# Patient Record
Sex: FEMALE | Race: WHITE | Employment: PART TIME | URBAN - NONMETROPOLITAN AREA
[De-identification: names, ages, dates, MRNs, and addresses within clinical notes are randomized per-mention and may not be internally consistent; named-entity substitution may affect disease eponyms.]

---

## 2018-05-03 ENCOUNTER — HOSPITAL ENCOUNTER (EMERGENCY)
Age: 28
Discharge: HOME | End: 2018-05-03
Payer: COMMERCIAL

## 2018-05-03 VITALS
HEART RATE: 72 BPM | RESPIRATION RATE: 16 BRPM | BODY MASS INDEX: 35.43 KG/M2 | TEMPERATURE: 98.1 F | SYSTOLIC BLOOD PRESSURE: 119 MMHG | WEIGHT: 200 LBS | DIASTOLIC BLOOD PRESSURE: 81 MMHG | OXYGEN SATURATION: 96 %

## 2018-05-03 DIAGNOSIS — J01.00 ACUTE NON-RECURRENT MAXILLARY SINUSITIS: Primary | ICD-10-CM

## 2018-05-03 PROCEDURE — 99213 OFFICE O/P EST LOW 20 MIN: CPT | Performed by: NURSE PRACTITIONER

## 2018-05-03 PROCEDURE — 99213 OFFICE O/P EST LOW 20 MIN: CPT

## 2018-05-03 RX ORDER — AMOXICILLIN AND CLAVULANATE POTASSIUM 875; 125 MG/1; MG/1
1 TABLET, FILM COATED ORAL 2 TIMES DAILY
Qty: 20 TABLET | Refills: 0 | Status: SHIPPED | OUTPATIENT
Start: 2018-05-03 | End: 2018-05-13

## 2018-05-03 ASSESSMENT — ENCOUNTER SYMPTOMS
NAUSEA: 0
SINUS PAIN: 1
COUGH: 1
RHINORRHEA: 1
ABDOMINAL PAIN: 0
DIARRHEA: 0
CONSTIPATION: 0
SHORTNESS OF BREATH: 0
WHEEZING: 0
SINUS PRESSURE: 1
SORE THROAT: 1

## 2018-05-03 ASSESSMENT — PAIN DESCRIPTION - LOCATION: LOCATION: THROAT

## 2018-05-03 ASSESSMENT — PAIN DESCRIPTION - PAIN TYPE: TYPE: ACUTE PAIN

## 2018-05-03 ASSESSMENT — PAIN SCALES - GENERAL: PAINLEVEL_OUTOF10: 4

## 2021-04-21 ENCOUNTER — TELEPHONE (OUTPATIENT)
Dept: ENT CLINIC | Age: 31
End: 2021-04-21

## 2021-04-22 NOTE — TELEPHONE ENCOUNTER
Lets try May 3rd or May 5th at 9am. I can have Dr Laureen Sierra check the area as well if she is seen on one of those days. We can have her follow up with a physician if necessary. She can be scheduled with a physician if she wants, but she will have to wait longer for an appointment.

## 2021-05-03 ENCOUNTER — OFFICE VISIT (OUTPATIENT)
Dept: ENT CLINIC | Age: 31
End: 2021-05-03
Payer: COMMERCIAL

## 2021-05-03 VITALS
WEIGHT: 206 LBS | SYSTOLIC BLOOD PRESSURE: 128 MMHG | TEMPERATURE: 98.1 F | HEIGHT: 63 IN | RESPIRATION RATE: 14 BRPM | BODY MASS INDEX: 36.5 KG/M2 | HEART RATE: 80 BPM | DIASTOLIC BLOOD PRESSURE: 70 MMHG

## 2021-05-03 DIAGNOSIS — K13.70 ORAL LESION: Primary | ICD-10-CM

## 2021-05-03 DIAGNOSIS — Z98.890 HISTORY OF NASAL SEPTOPLASTY: ICD-10-CM

## 2021-05-03 DIAGNOSIS — Z90.89 HISTORY OF TONSILLECTOMY AND ADENOIDECTOMY: ICD-10-CM

## 2021-05-03 PROCEDURE — G8427 DOCREV CUR MEDS BY ELIG CLIN: HCPCS | Performed by: PHYSICIAN ASSISTANT

## 2021-05-03 PROCEDURE — 1036F TOBACCO NON-USER: CPT | Performed by: PHYSICIAN ASSISTANT

## 2021-05-03 PROCEDURE — G8417 CALC BMI ABV UP PARAM F/U: HCPCS | Performed by: PHYSICIAN ASSISTANT

## 2021-05-03 PROCEDURE — 99203 OFFICE O/P NEW LOW 30 MIN: CPT | Performed by: PHYSICIAN ASSISTANT

## 2021-05-03 NOTE — PROGRESS NOTES
[facility] [place] [facility]  [facility] EAR, NOSE AND THROAT  [facility] [place]  Dept: [phone]  Dept Fax: [phone]  Loc: [phone]    Ludmila Martinez is a 31 y.o. female who was referred by Abraham Strange for:  Chief Complaint   Patient presents with    Lesion(s)     New patient is here for oral lesion ref by Dr. Mariella Bower. patient stated that she only has 1 skin tag like lesion    . HPI:     Patient presents for evaluation of right-sided oral lesions. Patient reports that these appear like skin tags to her. 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 She states that couple weeks ago she noticed something on the right side which that are mildly concerned. She states that it is small and has remained stable in size since she initially noticed it. She denies any associated symptoms of pain/odynophagia, dysphagia, night sweats, fevers, chills, changes in voice, unintentional weight loss, loss of appetite. She denies any history of HPV that she is aware of. She states that she had her tonsils removed when she was 14years old. She had an adenoidectomy and septoplasty with Dr. Honey Ng in 2015. She does report a history of smoking cigarettes for about 10 years as well as smoking marijuana for about 16 years. She denies any recent alcohol consumption but states she used to drink it socially in the past.  She was a previous heroin user and is on Vivitrol. She denies any other symptoms or concerns at this time. Subjective:      REVIEW OF SYSTEMS:    A complete multi-organ review of systems was performed using a new patient questionnaire, and reviewed by me.   ENT:  negative except as noted in HPI  CONSTITUTIONAL:  negative except as noted in HPI  EYES:  negative except as noted in HPI  RESPIRATORY:  negative except as noted in HPI  CARDIOVASCULAR:  negative except as noted in HPI  GASTROINTESTINAL:  negative except as noted in HPI  GENITOURINARY:  negative except as noted in HPI  MUSCULOSKELETAL:  negative except as noted in HPI  SKIN:  negative except as noted in HPI  ENDOCRINE/METABOLIC: negative except as noted in HPI  HEMATOLOGIC/LYMPHATIC:  negative except as noted in HPI  ALLERGY/IMMUN: negative except as noted in HPI  NEUROLOGICAL:  negative except as noted in HPI  BEHAVIOR/PSYCH:  negative except as noted in HPI    Past Medical History:  Past Medical History:   Diagnosis Date    Bipolar disorder ([facility] [address]     Pt states dx in     Tobacco abuse      Social History:    TOBACCO:   reports that she quit smoking about 6 years ago. Her smoking use included cigarettes. She started smoking about 14 years ago. She has a 3.00 pack-year smoking history. She has never used smokeless tobacco.  ETOH:   reports no history of alcohol use. DRUGS:   reports previous drug use. Family History:       Problem Relation Age of Onset    Bipolar Disorder Mother     Schizophrenia Mother     COPD Mother     Emphysema Mother     Other Father         Nodules in lungs due to painting for years.  COPD Maternal Grandmother     Emphysema Maternal Grandmother     Other Maternal Grandfather         Pt states maternal grandpa  during heart procedure.  Rheum Arthritis Paternal Grandmother        Surgical History:  Past Surgical History:   Procedure Laterality Date    ADENOIDECTOMY  1/15/2016    BLADDER REPAIR      Pt states bladder construction when she was 1 y.o.    ELBOW SURGERY  2010    left   arthroscopy    SEPTOPLASTY  1/15/2016    Turbinoplasties    TONSILLECTOMY      at 14years of age   Mirian Navarrete [address]      Pt states had this at 14 y.o.    WISDOM TOOTH EXTRACTION          Objective: This is a 31 y.o. female. Patient is alert and oriented to person, place and time.  Patient appears well developed, well nourished. Mood is happy with normal affect. Not obviously hearing impaired. No abnormality in speech noted. /70 (Site: Left Upper Arm, Position: Sitting)   Pulse 80   Temp 98.1 °F (36.7 °C) (Infrared)   Resp 14   Ht 5' 3\" (1.6 m)   Wt 206 lb (93.4 kg)   BMI 36.49 kg/m²     Head:   Normocephalic, atraumatic. No obvious masses or lesions noted. Ears:  External ears: Normal: no scars, lesions or masses. Mastoid process: No erythema noted. No tenderness to palpation. R External auditory canal: clear and free of any pathology  L External auditory canal: clear and free of any pathology   Tympanic membranes:  R intact, translucent                                                  L intact, translucent  Nose:    External nose: Appears midline. No obvious deformity or masses. Septum:  normal. No septal hematoma. No perforation. Mucosa:  clear  Turbinates: normal and pink            Discharge:  none    Mouth/Throat:  Lips, tongue and oral cavity: 2 small lesions along the posterior right tongue (only a few mm's wide). Very close to the lateral surface. There are no other visible oral lesions at this time. These appear to be most likely papillae. See picture below. Dentition: Fair, no malocclusion  Oral mucosa: moist  Tonsils: Absent  Oropharynx: normal-appearing mucosa  Hard and soft palates: symmetrical and intact. Salivary glands: not enlarged and no tenderness to palpation. Uvula: midline, no obvious lesions   Gag reflex is present. Neck: Trachea midline. Thyroid not enlarged, no palpable masses or tenderness. Lymphatic: No cervical lymphadenopathy noted. Eyes: CEDRICK, EOM intact. Conjunctiva moist without discharge. Lungs: Normal effort of breathing, not obviously distressed. Neuro: Cranial nerves II-XII grossly intact. Extremities: No clubbing, edema, or cyanosis noted. Assessment/Plan:     Diagnosis Orders   1. Oral lesion     2.  History of tonsillectomy and adenoidectomy 3. History of nasal septoplasty         The patient is a 31 y.o. female that presents for evaluation of oral lesions. Explained to the patient that these lesions are not concerning appearing at this time and she has no concerning associated symptoms as well. I recommended she follow-up in a few weeks for repeat examination. If these persist will consider biopsy to rule out any concerning etiologies. Patient will contact the office sooner with new/worsening symptoms or other concerns. She expresses understanding the plan and thanked me. I discussed the plan with Dr. Bryce Sánchez who also reviewed the picture after the visit was completed and he agreed with the current recommendation for follow-up examination and possible biopsy in future.     Electronically signed by KE Hughes on 5/4/2021 at 2:12 PM

## 2021-05-04 ENCOUNTER — TELEPHONE (OUTPATIENT)
Dept: ENT CLINIC | Age: 31
End: 2021-05-04

## 2021-05-04 NOTE — TELEPHONE ENCOUNTER
Called patient. Informed her Dr Mikal Hoffmann does not feel her oral lesions are concerning at this time. He does however want to see her back in 2-3 weeks to re-examine the lesions to be sure nothing has changed. Informed patient she should call us sooner if she notices any changes in the size or any new concerning symptoms arise. Appointment scheduled. Patient verbalized understanding and thanked me.

## 2021-05-04 NOTE — TELEPHONE ENCOUNTER
Please contact patient and inform her that Dr. Yandy Yee does not feel that the oral lesions are concerning at this time. However, we would like the patient to follow-up in approximately 2 to 3 weeks for repeat examination to ensure nothing has changed. We will discuss if a biopsy is recommended at that visit if the lesions remain. She should call us sooner if she notices any changes in the size or develops any other concerning symptoms.

## 2021-05-18 ENCOUNTER — OFFICE VISIT (OUTPATIENT)
Dept: ENT CLINIC | Age: 31
End: 2021-05-18
Payer: COMMERCIAL

## 2021-05-18 VITALS
WEIGHT: 208.9 LBS | RESPIRATION RATE: 14 BRPM | SYSTOLIC BLOOD PRESSURE: 126 MMHG | HEIGHT: 63 IN | TEMPERATURE: 98.1 F | DIASTOLIC BLOOD PRESSURE: 74 MMHG | HEART RATE: 88 BPM | BODY MASS INDEX: 37.02 KG/M2

## 2021-05-18 DIAGNOSIS — R59.9 LYMPHOID HYPERPLASIA: Primary | ICD-10-CM

## 2021-05-18 PROBLEM — H44.9 DISORDER OF EYEBALL: Status: ACTIVE | Noted: 2021-05-18

## 2021-05-18 PROBLEM — L70.9 ACNE: Status: ACTIVE | Noted: 2017-10-24

## 2021-05-18 PROBLEM — R05.9 COUGH: Status: ACTIVE | Noted: 2018-03-20

## 2021-05-18 PROBLEM — E66.9 SIMPLE OBESITY: Status: ACTIVE | Noted: 2019-11-11

## 2021-05-18 PROBLEM — J40 BRONCHITIS: Status: ACTIVE | Noted: 2020-01-23

## 2021-05-18 PROBLEM — R00.0 TACHYCARDIA, UNSPECIFIED: Status: ACTIVE | Noted: 2017-10-24

## 2021-05-18 PROBLEM — F17.210 TOBACCO DEPENDENCE DUE TO CIGARETTES: Status: ACTIVE | Noted: 2020-07-31

## 2021-05-18 PROBLEM — M79.673 PAIN OF FOOT: Status: ACTIVE | Noted: 2021-05-18

## 2021-05-18 PROBLEM — J06.9 UPPER RESPIRATORY TRACT INFECTION: Status: ACTIVE | Noted: 2017-11-27

## 2021-05-18 PROBLEM — A60.00 GENITAL HERPES: Status: ACTIVE | Noted: 2017-08-16

## 2021-05-18 PROBLEM — E66.9 OBESITY WITH BODY MASS INDEX 30 OR GREATER: Status: ACTIVE | Noted: 2017-08-16

## 2021-05-18 PROBLEM — T50.901A DRUG OVERDOSE: Status: ACTIVE | Noted: 2021-05-18

## 2021-05-18 PROCEDURE — G8427 DOCREV CUR MEDS BY ELIG CLIN: HCPCS | Performed by: OTOLARYNGOLOGY

## 2021-05-18 PROCEDURE — 99213 OFFICE O/P EST LOW 20 MIN: CPT | Performed by: OTOLARYNGOLOGY

## 2021-05-18 PROCEDURE — G8417 CALC BMI ABV UP PARAM F/U: HCPCS | Performed by: OTOLARYNGOLOGY

## 2021-05-18 PROCEDURE — 1036F TOBACCO NON-USER: CPT | Performed by: OTOLARYNGOLOGY

## 2021-05-18 NOTE — PROGRESS NOTES
[facility] [place] [facility]  [facility] EAR, NOSE AND THROAT  [facility]  Dept: [phone]  Dept Fax: [phone]  Loc: [phone]    Emma Sky is a 31 y.o. female who was referred by No ref. provider found for:  Chief Complaint   Patient presents with    Mouth Lesions     Patient is her for 2 week follow up oral lesion        HPI:     Emma Sky is a 31 y.o. female seen a couple of weeks ago by Archie Covington PA-C for evaluation of an oral cavity lesion along the anterior pillar of the right tonsillar fossa. There is a picture in the chart media section as well as an Stievn's note with an image at that lesion. The patient is here for further evaluation and consideration of a biopsy. Of note is the fact that she has no particular risk factors for head neck cancer and states that not only is the lesion nontender but it has never bled nor has she noticed any particular change in that area. History:     No Known Allergies  Current Outpatient Medications   Medication Sig Dispense Refill    naltrexone (VIVITROL) 380 MG injection Inject 380 mg into the muscle once      etonogestrel (NEXPLANON) 68 MG implant Inject 68 mg into the skin once       No current facility-administered medications for this visit.      Past Medical History:   Diagnosis Date    Bipolar disorder ([facility] [address]     Pt states dx in 2009    Depressive disorder 5/2/2011    Simple obesity 11/11/2019    Tobacco abuse       Past Surgical History:   Procedure Laterality Date    ADENOIDECTOMY  1/15/2016    BLADDER REPAIR      Pt states bladder construction when she was 1 y.o.    ELBOW SURGERY  2010    left   arthroscopy    SEPTOPLASTY  1/15/2016    Turbinoplasties    TONSILLECTOMY      at 14years of age   Regina Kennedy TONSILLECTOMY AND ADENOIDECTOMY      Pt states had this at 14 y.o.    WISDOM TOOTH EXTRACTION  2015     Family History   Problem Relation Age of Onset     10/28/2016     10/06/2016    K 3.8 11/13/2016    K 4.2 10/28/2016    K 4.1 10/06/2016     11/13/2016     10/28/2016     10/06/2016    CO2 26 11/13/2016    CO2 26 10/28/2016    CO2 22 10/06/2016    BUN 10 11/13/2016    BUN 16 10/28/2016    BUN 16 10/06/2016    CREATININE 0.6 11/13/2016    CREATININE 0.91 10/28/2016    CREATININE 1.0 10/06/2016    CALCIUM 9.2 11/13/2016    CALCIUM 8.5 10/28/2016    CALCIUM 9.3 10/06/2016    PROT 7.1 11/13/2016    PROT 6.1 10/28/2016    PROT 7.3 10/06/2016    LABALBU 4.3 11/13/2016    LABALBU 4.6 10/28/2016    LABALBU 4.5 10/06/2016    BILITOT 0.5 11/13/2016    BILITOT 0.4 10/28/2016    BILITOT 0.3 10/06/2016    ALKPHOS 68 11/13/2016    ALKPHOS 61 10/28/2016    ALKPHOS 71 10/06/2016    AST 15 11/13/2016    AST 24 10/28/2016    AST 24 10/06/2016    ALT 10 11/13/2016    ALT 33 10/28/2016    ALT 22 10/06/2016       All of the past medical history, past surgical history, family history,social history, allergies and current medications were reviewed with the patient. Assessment & Plan   Diagnoses and all orders for this visit:     Diagnosis Orders   1. Lymphoid hyperplasia      Of the oropharynx; bilateral; symmetrical       Based on history and these physical findings, the chances that she would have a neoplastic let alone a malignant process in identical location  by bridge of normal tissue i.e. the tongue is so remote as to be impossible. I recommended against a biopsy of either side if and until any of this tissue declares itself more concerning such as with bleeding or clear growth or extension into the surrounding spaces. I recommended against over surveillance and would be happy to see her in follow-up should she notice any particular difference. Fact that told her that she could upload a picture of the area if she thinks it has meaningfully changed not be happy to look at it and compared to these images.   It would not be difficult to

## 2021-06-17 PROBLEM — R05.9 COUGH: Status: RESOLVED | Noted: 2018-03-20 | Resolved: 2021-06-17

## 2022-06-13 SDOH — HEALTH STABILITY: PHYSICAL HEALTH: ON AVERAGE, HOW MANY MINUTES DO YOU ENGAGE IN EXERCISE AT THIS LEVEL?: 60 MIN

## 2022-06-13 SDOH — HEALTH STABILITY: PHYSICAL HEALTH: ON AVERAGE, HOW MANY DAYS PER WEEK DO YOU ENGAGE IN MODERATE TO STRENUOUS EXERCISE (LIKE A BRISK WALK)?: 2 DAYS

## 2022-06-13 ASSESSMENT — SOCIAL DETERMINANTS OF HEALTH (SDOH)

## 2022-06-15 ENCOUNTER — OFFICE VISIT (OUTPATIENT)
Dept: FAMILY MEDICINE CLINIC | Age: 32
End: 2022-06-15
Payer: COMMERCIAL

## 2022-06-15 VITALS
RESPIRATION RATE: 14 BRPM | WEIGHT: 207.4 LBS | HEART RATE: 89 BPM | TEMPERATURE: 97.4 F | SYSTOLIC BLOOD PRESSURE: 138 MMHG | HEIGHT: 63 IN | DIASTOLIC BLOOD PRESSURE: 88 MMHG | BODY MASS INDEX: 36.75 KG/M2 | OXYGEN SATURATION: 89 %

## 2022-06-15 DIAGNOSIS — B36.0 TINEA VERSICOLOR: ICD-10-CM

## 2022-06-15 DIAGNOSIS — E66.09 CLASS 2 OBESITY DUE TO EXCESS CALORIES WITHOUT SERIOUS COMORBIDITY WITH BODY MASS INDEX (BMI) OF 36.0 TO 36.9 IN ADULT: ICD-10-CM

## 2022-06-15 DIAGNOSIS — Z76.89 ENCOUNTER TO ESTABLISH CARE: Primary | ICD-10-CM

## 2022-06-15 DIAGNOSIS — F32.5 MAJOR DEPRESSIVE DISORDER IN FULL REMISSION, UNSPECIFIED WHETHER RECURRENT (HCC): ICD-10-CM

## 2022-06-15 DIAGNOSIS — F19.10 POLYSUBSTANCE ABUSE (HCC): ICD-10-CM

## 2022-06-15 PROBLEM — E66.812 CLASS 2 OBESITY DUE TO EXCESS CALORIES WITHOUT SERIOUS COMORBIDITY WITH BODY MASS INDEX (BMI) OF 36.0 TO 36.9 IN ADULT: Status: ACTIVE | Noted: 2019-11-11

## 2022-06-15 PROCEDURE — G8427 DOCREV CUR MEDS BY ELIG CLIN: HCPCS | Performed by: NURSE PRACTITIONER

## 2022-06-15 PROCEDURE — 1036F TOBACCO NON-USER: CPT | Performed by: NURSE PRACTITIONER

## 2022-06-15 PROCEDURE — 99203 OFFICE O/P NEW LOW 30 MIN: CPT | Performed by: NURSE PRACTITIONER

## 2022-06-15 PROCEDURE — G8417 CALC BMI ABV UP PARAM F/U: HCPCS | Performed by: NURSE PRACTITIONER

## 2022-06-15 RX ORDER — SUMATRIPTAN 100 MG/1
TABLET, FILM COATED ORAL
COMMUNITY
Start: 2022-04-19 | End: 2022-06-15 | Stop reason: ALTCHOICE

## 2022-06-15 RX ORDER — KETOCONAZOLE 20 MG/ML
SHAMPOO TOPICAL
Qty: 120 ML | Refills: 1 | Status: SHIPPED | OUTPATIENT
Start: 2022-06-15

## 2022-06-15 RX ORDER — BUPROPION HYDROCHLORIDE 75 MG/1
TABLET ORAL
COMMUNITY
Start: 2022-05-16 | End: 2022-06-15 | Stop reason: ALTCHOICE

## 2022-06-15 RX ORDER — TERBINAFINE HYDROCHLORIDE 250 MG/1
TABLET ORAL
COMMUNITY
Start: 2022-05-31 | End: 2022-06-15 | Stop reason: ALTCHOICE

## 2022-06-15 SDOH — ECONOMIC STABILITY: FOOD INSECURITY: WITHIN THE PAST 12 MONTHS, THE FOOD YOU BOUGHT JUST DIDN'T LAST AND YOU DIDN'T HAVE MONEY TO GET MORE.: NEVER TRUE

## 2022-06-15 SDOH — ECONOMIC STABILITY: FOOD INSECURITY: WITHIN THE PAST 12 MONTHS, YOU WORRIED THAT YOUR FOOD WOULD RUN OUT BEFORE YOU GOT MONEY TO BUY MORE.: NEVER TRUE

## 2022-06-15 ASSESSMENT — PATIENT HEALTH QUESTIONNAIRE - PHQ9
6. FEELING BAD ABOUT YOURSELF - OR THAT YOU ARE A FAILURE OR HAVE LET YOURSELF OR YOUR FAMILY DOWN: 0
SUM OF ALL RESPONSES TO PHQ QUESTIONS 1-9: 0
7. TROUBLE CONCENTRATING ON THINGS, SUCH AS READING THE NEWSPAPER OR WATCHING TELEVISION: 0
3. TROUBLE FALLING OR STAYING ASLEEP: 0
8. MOVING OR SPEAKING SO SLOWLY THAT OTHER PEOPLE COULD HAVE NOTICED. OR THE OPPOSITE, BEING SO FIGETY OR RESTLESS THAT YOU HAVE BEEN MOVING AROUND A LOT MORE THAN USUAL: 0
4. FEELING TIRED OR HAVING LITTLE ENERGY: 0
SUM OF ALL RESPONSES TO PHQ QUESTIONS 1-9: 0
SUM OF ALL RESPONSES TO PHQ QUESTIONS 1-9: 0
SUM OF ALL RESPONSES TO PHQ9 QUESTIONS 1 & 2: 0
5. POOR APPETITE OR OVEREATING: 0
9. THOUGHTS THAT YOU WOULD BE BETTER OFF DEAD, OR OF HURTING YOURSELF: 0
2. FEELING DOWN, DEPRESSED OR HOPELESS: 0
10. IF YOU CHECKED OFF ANY PROBLEMS, HOW DIFFICULT HAVE THESE PROBLEMS MADE IT FOR YOU TO DO YOUR WORK, TAKE CARE OF THINGS AT HOME, OR GET ALONG WITH OTHER PEOPLE: 0
SUM OF ALL RESPONSES TO PHQ QUESTIONS 1-9: 0
1. LITTLE INTEREST OR PLEASURE IN DOING THINGS: 0

## 2022-06-15 ASSESSMENT — SOCIAL DETERMINANTS OF HEALTH (SDOH): HOW HARD IS IT FOR YOU TO PAY FOR THE VERY BASICS LIKE FOOD, HOUSING, MEDICAL CARE, AND HEATING?: NOT VERY HARD

## 2022-06-15 NOTE — PROGRESS NOTES
[facility] at / [address] [facility] [address], [address]  Chief Complaint   Patient presents with   [address] [facility] Patient    Rash     for about 3 weeks        History obtained from chart review and the patient. SUBJECTIVE:  Alfred Ohara is a 27 y.o. female that presents today for establishing care with new physician, etc. New patient, [facility] [address]. Hx of polysubstance substance abuse (opioids, shrooms, cocaine, marijuana etc), she followed with Dr Eleanor Neves and she was receiving Vivitrol for about 5 years. She has done IV drugs 2-3 times. She has been off the Vivitrol since April and is doing well. She has completed all the programs etc and has been discharged and clean. 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 She was getting migraines whenever she was due for the Vivitrol, but since being off the Vivitrol she hasn't been getting them. She does get HA's but they are manageable with tylenol. She was treated with Wellbutrin for depression by prior PCP. She didn't like the way it made her feel, made her heart race. She has stopped it. Moods have been doing well. Rash    HPI:    Length of time Sx have been present - 2-3 weeks  Rash has gotten unchanged since initially starting  Affected areas - upper chest, abdomen  Inciting events or exposures prior to rash starting? No, but started after she recently started going to the gym and exercising  Pruritic? No  Erythematous? Yes  Weeping or drainage? No  History of Urticaria? No  Fever? No  Painful? No    Review of Systems - General ROS: negative for - chills, fever or night sweats  Respiratory ROS: negative for - shortness of breath, stridor or wheezing    She started seeing a hormone doctor in [facility] to help with weight loss. She is possibly going to start Ozempic. She is also exercising regularly.  She is not interested in Adipex due to prior Hx of substance abuse    Age/Gender Health Maintenance    Lipid - 40  DM Screen - 40  Colon Cancer Screening - 39  Lung Cancer Screening (Age 54 to [de-identified] with 30 pack year hx, current smoker or quit within past 15 years) - 48    Tetanus - needs  Influenza Vaccine - yearly  Pneumonia Vaccine - 65  Zostavax - 50   HPV Vaccine - n/a    Breast Cancer Screening - 50  Cervical Cancer Screening - 21  Osteoporosis Screening - 65  Chlamydia Screen - n/a    PSA testing discussion - n/a  AAA Screening - n/a    Falls screening - n/a    Current Outpatient Medications   Medication Sig Dispense Refill    ketoconazole (NIZORAL) 2 % shampoo Apply topically daily as needed. 120 mL 1    etonogestrel (NEXPLANON) 68 MG implant Inject 68 mg into the skin once       No current facility-administered medications for this visit. Orders Placed This Encounter   Medications    ketoconazole (NIZORAL) 2 % shampoo     Sig: Apply topically daily as needed. Dispense:  120 mL     Refill:  1         All medications reviewed and reconciled, including OTC and herbal medications. Updated list given to patient.        Patient Active Problem List   Diagnosis    Skin lesion of back    S/P tonsillectomy and adenoidectomy    Adenoid hypertrophy    Lymphoid hyperplasia    Acne    Bronchitis    Depressive disorder    Disorder of eyeball    Drug overdose    Genital herpes    Obesity with body mass index 30 or greater    Pain of foot    Class 2 obesity due to excess calories without serious comorbidity with body mass index (BMI) of 36.0 to 36.9 in adult    Tachycardia, unspecified    Tobacco dependence due to cigarettes    Upper respiratory tract infection    History of polysubstance abuse ([facility] [address]       Past Medical History:   Diagnosis Date    Bipolar disorder ([facility] [address]     Pt states dx in 2009    Depressive disorder 5/2/2011    Simple obesity 11/11/2019    Tobacco abuse        Past Surgical History:   Procedure Laterality Date    ADENOIDECTOMY  1/15/2016    BLADDER REPAIR Pt states bladder construction when she was 1 y.o.    ELBOW SURGERY  2010    left   arthroscopy    SEPTOPLASTY  1/15/2016    Turbinoplasties    TONSILLECTOMY      at 14years of age   [address]      Pt states had this at 14 y.o.    [address] EXTRACTION         No Known Allergies    Social History     Socioeconomic History    Marital status: Single     Spouse name: Not on file    Number of children: Not on file    Years of education: Not on file    Highest education level: Not on file   Occupational History    Not on file   Tobacco Use    Smoking status: Former Smoker     Packs/day: 0.50     Years: 6.00     Pack years: 3.00     Types: Cigarettes     Start date: 2007     Quit date: 10/13/2014     Years since quittin.6    Smokeless tobacco: Never Used   Substance and Sexual Activity    Alcohol use: No     Alcohol/week: 0.0 standard drinks    Drug use: Not Currently     Comment: heroin- last use 2017    Sexual activity: Never   Other Topics Concern    Not on file   Social History Narrative    Not on file     Social Determinants of Health     Financial Resource Strain: Low Risk     Difficulty of Paying Living Expenses: Not very hard   Food Insecurity: No Food Insecurity    Worried About Running Out of Food in the Last Year: Never true    [place] of Food in the Last Year: Never true   Transportation Needs:     Lack of Transportation (Medical): Not on file    Lack of Transportation (Non-Medical):  Not on file   Physical Activity: Insufficiently Active    Days of Exercise per Week: 2 days    Minutes of Exercise per Session: 60 min   Stress:     Feeling of Stress : Not on file   Social Connections:     Frequency of Communication with Friends and Family: Not on file    Frequency of Social Gatherings with Friends and Family: Not on file    Attends Zoroastrian Services: Not on file    Active Member of Clubs or Organizations: Not on file    Attends Club or Organization Meetings: Not on file    Marital Status: Not on file   Intimate Partner Violence: Not At Risk    Fear of Current or Ex-Partner: No    Emotionally Abused: No    Physically Abused: No    Sexually Abused: No   Housing Stability:     Unable to Pay for Housing in the Last Year: Not on file    Number of Places Lived in the Last Year: Not on file    Unstable Housing in the Last Year: Not on file       Family History   Problem Relation Age of Onset    Bipolar Disorder Mother     Schizophrenia Mother     COPD Mother     Emphysema Mother     Other Father         Nodules in lungs due to painting for years.  COPD Maternal Grandmother     Emphysema Maternal Grandmother     Other Maternal Grandfather         Pt states maternal grandpa  during heart procedure.  Rheum Arthritis Paternal Grandmother          I have reviewed the patient's past medical history, past surgical history, allergies, medications, social and family history and I have made updates where appropriate.       Review of Systems  Positive responses are highlighted in bold    Constitutional:  Fever, Chills, Night Sweats, Fatigue, Unexpected changes in weight  Eyes:  Eye discharge, Eye pain, Eye redness, Visual disturbances   HENT:  Ear pain, Tinnitus, Nosebleeds, Trouble swallowing, Hearing loss, Sore throat  Cardiovascular:  Chest Pain, Palpitations, Orthopnea, Paroxysmal Nocturnal Dyspnea  Respiratory:  Cough, Wheezing, Shortness of breath, Chest tightness, Apnea  Gastrointestinal:  Nausea, Vomiting, Diarrhea, Constipation, Heartburn, Blood in stool  Genitourinary:  Difficulty or painful urination, Flank pain, Change in frequency, Urgency  Skin:  Color change, Rash, Itching, Wound  Psychiatric:  Hallucinations, Anxiety, Depression, Suicidal ideation  Hematological:  Enlarged glands, Easy bleeding, Easily bruising  Musculoskeletal:  Joint pain, Back pain, Gait problems, Joint swelling, Myalgias  Neurological:  Dizziness, Headaches, Presyncope, Numbness, Seizures, Tremors  Allergy:  Environmental allergies, Food allergies  Endocrine:  Heat Intolerance, Cold Intolerance, Polydipsia, Polyphagia, Polyuria      PHYSICAL EXAM:  Vitals:    06/15/22 0836   BP: 138/88   Pulse: 89   Resp: 14   Temp: 97.4 °F (36.3 °C)   TempSrc: Infrared   SpO2: (!) 89%   Weight: 207 lb 6.4 oz (94.1 kg)   Height: 5' 3\" (1.6 m)     Body mass index is 36.74 kg/m². VS Reviewed  General Appearance: A&O x 3, No acute distress,well developed and well- nourished  Head: normocephalic and atraumatic  Eyes: pupils equal, round, and reactive to light, extraocular eye movements intact, conjunctivae and eye lids without erythema  Neck: supple and non-tender without mass, no thyromegaly or thyroid nodules, no cervical lymphadenopathy  Pulmonary/Chest: clear to auscultation bilaterally- no wheezes, rales or rhonchi, normal air movement, no respiratory distress or retractions  Cardiovascular: S1 and S2 auscultated w/ RRR. No murmurs, rubs, clicks, or gallops, distal pulses intact. Abdomen: soft, non-tender, non-distended, bowl sounds physiologic,  no rebound or guarding, no masses or hernias noted. Liver and spleen without enlargement. Extremities: no cyanosis, clubbing or edema of the lower extremities  Musculoskeletal: No joint swelling or gross deformity   Neuro:  Alert, 5/5 strength globally and symmetrically  Psych: Affect appropriate. Mood normal. Thought process is normal without evidence of depression or psychosis. Good insight and appropriate interaction. Cognition and memory appear to be intact. Skin: warm and dry, macular mildly erythematous rash chest and neck  Lymph:  No cervical, auricular or supraclavicular lymph nodes palpated    ASSESSMENT & PLAN  Gina Wilson was seen today for new patient and rash.     Diagnoses and all orders for this visit:    Encounter to establish care    Class 2 obesity due to excess calories without serious comorbidity with body mass index (BMI) of 36.0 to 36.9 in adult    Tinea versicolor  -     ketoconazole (NIZORAL) 2 % shampoo; Apply topically daily as needed. History of polysubstance abuse ([address]      - completed course of Vivitrol for substance abuse and is clean  - depression in remission, will monitor for now  - con't working on weight loss, diet/exerise  - ok for Ozempic from my standpoint  - start topical Nizoral    DISPOSITION    Return if symptoms worsen or fail to improve. Romeos Matilde released without restrictions. PATIENT COUNSELING    Counseling was provided today regarding the following topics: Healthy eating habits, Regular exercise, substance abuse and healthy sleep habits. Kat Clayton received counseling on the following healthy behaviors: nutrition and exercise    Patient given educational materials on: See Attached    I have instructed Kat Clayton to complete a self tracking handout on none and instructed them to bring it with them to her next appointment. Barriers to learning and self management: none    Discussed use, benefit, and side effects of prescribed medications. Barriers to medication compliance addressed. All patient questions answered. Pt voiced understanding.        Electronically signed by JESSEE Franco CNP on 6/15/2022 at 9:09 AM

## 2022-09-14 ENCOUNTER — PATIENT MESSAGE (OUTPATIENT)
Dept: FAMILY MEDICINE CLINIC | Age: 32
End: 2022-09-14

## 2022-09-14 DIAGNOSIS — Z83.1 FAMILY HISTORY OF COLD SORES: Primary | ICD-10-CM

## 2022-09-14 DIAGNOSIS — B00.1 COLD SORE: ICD-10-CM

## 2022-09-14 RX ORDER — VALACYCLOVIR HYDROCHLORIDE 1 G/1
1000 TABLET, FILM COATED ORAL 2 TIMES DAILY
Qty: 6 TABLET | Refills: 2 | Status: SHIPPED | OUTPATIENT
Start: 2022-09-14 | End: 2022-09-17

## 2022-09-14 NOTE — TELEPHONE ENCOUNTER
From: Napoleon Oh  To: Ileana Quigley  Sent: 9/14/2022 10:49 AM EDT  Subject: Prescription     I am needing a prescription for Valacyclovir 1000mg tabs. 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 I use [facility] on [address]. Please let me know if I need to schedule a visit for this. Thanks in advance.

## 2022-10-20 ENCOUNTER — OFFICE VISIT (OUTPATIENT)
Dept: FAMILY MEDICINE CLINIC | Age: 32
End: 2022-10-20
Payer: COMMERCIAL

## 2022-10-20 VITALS
HEART RATE: 88 BPM | TEMPERATURE: 98.3 F | DIASTOLIC BLOOD PRESSURE: 68 MMHG | WEIGHT: 181.8 LBS | SYSTOLIC BLOOD PRESSURE: 116 MMHG | BODY MASS INDEX: 32.21 KG/M2 | HEIGHT: 63 IN | RESPIRATION RATE: 14 BRPM | OXYGEN SATURATION: 98 %

## 2022-10-20 DIAGNOSIS — F19.91 HISTORY OF INTRAVENOUS DRUG USE IN REMISSION: ICD-10-CM

## 2022-10-20 DIAGNOSIS — B02.9 HERPES ZOSTER WITHOUT COMPLICATION: Primary | ICD-10-CM

## 2022-10-20 LAB
ABSOLUTE BASO #: 0.05 K/UL (ref 0–0.2)
ABSOLUTE EOS #: 0.09 K/UL (ref 0–0.5)
ABSOLUTE LYMPH #: 2.22 K/UL (ref 1–4)
ABSOLUTE MONO #: 0.55 K/UL (ref 0.2–1)
ABSOLUTE NEUT #: 3.89 K/UL (ref 1.5–7.5)
ALBUMIN SERPL-MCNC: 4.6 G/DL (ref 3.5–5.2)
ALK PHOSPHATASE: 59 U/L (ref 40–114)
ALT SERPL-CCNC: 10 U/L (ref 5–40)
ANION GAP SERPL CALCULATED.3IONS-SCNC: 10 MEQ/L (ref 7–16)
AST SERPL-CCNC: 12 U/L (ref 9–40)
BASOPHILS RELATIVE PERCENT: 0.7 %
BILIRUB SERPL-MCNC: 0.8 MG/DL
BUN BLDV-MCNC: 13 MG/DL (ref 6–20)
CALCIUM SERPL-MCNC: 9.3 MG/DL (ref 8.5–10.5)
CHLORIDE BLD-SCNC: 104 MEQ/L (ref 95–107)
CO2: 24 MEQ/L (ref 19–31)
CREAT SERPL-MCNC: 0.84 MG/DL (ref 0.6–1.3)
EGFR IF NONAFRICAN AMERICAN: 94 ML/MIN/1.73
EOSINOPHILS RELATIVE PERCENT: 1.3 %
GLUCOSE: 91 MG/DL (ref 70–99)
HCT VFR BLD CALC: 42.6 % (ref 34–45)
HEMOGLOBIN: 14.3 G/DL (ref 11.5–15.5)
HEPATITIS C ANTIBODY: NORMAL
HIV 1,2 COMBO ANTIGEN/ANTIBODY: NORMAL
LYMPHOCYTE %: 32.6 %
MCH RBC QN AUTO: 28.1 PG (ref 25–33)
MCHC RBC AUTO-ENTMCNC: 33.6 G/DL (ref 31–36)
MCV RBC AUTO: 83.9 FL (ref 80–99)
MONOCYTES # BLD: 8.1 %
NEUTROPHILS RELATIVE PERCENT: 57 %
PDW BLD-RTO: 13.9 % (ref 11.5–15)
PLATELETS: 271 K/UL (ref 130–400)
PMV BLD AUTO: 10.5 FL (ref 9.3–13)
POTASSIUM SERPL-SCNC: 4.2 MEQ/L (ref 3.5–5.4)
RBC: 5.08 M/UL (ref 3.8–5.4)
SODIUM BLD-SCNC: 138 MEQ/L (ref 133–146)
TOTAL PROTEIN: 6.4 G/DL (ref 6.1–8.3)
WBC: 6.8 K/UL (ref 3.5–11)

## 2022-10-20 PROCEDURE — G8427 DOCREV CUR MEDS BY ELIG CLIN: HCPCS | Performed by: NURSE PRACTITIONER

## 2022-10-20 PROCEDURE — G8417 CALC BMI ABV UP PARAM F/U: HCPCS | Performed by: NURSE PRACTITIONER

## 2022-10-20 PROCEDURE — G8484 FLU IMMUNIZE NO ADMIN: HCPCS | Performed by: NURSE PRACTITIONER

## 2022-10-20 PROCEDURE — 1036F TOBACCO NON-USER: CPT | Performed by: NURSE PRACTITIONER

## 2022-10-20 PROCEDURE — 99214 OFFICE O/P EST MOD 30 MIN: CPT | Performed by: NURSE PRACTITIONER

## 2022-10-20 RX ORDER — SEMAGLUTIDE 1.34 MG/ML
INJECTION, SOLUTION SUBCUTANEOUS
COMMUNITY

## 2022-10-20 NOTE — PROGRESS NOTES
[facility] Medicine at / [address] [facility] [address]la[address] FRIEDMAN. [address]  Chief Complaint   Patient presents with    Other     Pt states \" every time I take care of a patient with shingles I get them\"        History obtained from chart review and the patient. SUBJECTIVE:  Mihai James is a 34 y.o. female that presents today for rash    Hx of polysubstance substance abuse (opioids, shrooms, cocaine, marijuana etc), she followed with Dr Sonam Ladd and she was receiving Vivitrol for about 5 years. She has done IV drugs 2-3 times. She has been off the Vivitrol since April and is doing well. She has completed all the programs etc and has been discharged and clean. 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 Patient took care of a shingles patient at work about 6 months ago. No direct contact but she broke out in itchy rash for several days. She had another shingles patient a month or so ago and she did have another rash several days later. It was itchy and also had some electric shocks sensation in the area . Took Acyclovir and it was gone in several days. Age/Gender Health Maintenance    Lipid - 40  DM Screen - 40  Colon Cancer Screening - 39  Lung Cancer Screening (Age 54 to [de-identified] with 30 pack year hx, current smoker or quit within past 15 years) - 48    Tetanus - needs  Influenza Vaccine - yearly  Pneumonia Vaccine - 65  Zostavax - 50   HPV Vaccine - n/a    Breast Cancer Screening - 50  Cervical Cancer Screening - 21  Osteoporosis Screening - 65  Chlamydia Screen - n/a    PSA testing discussion - n/a  AAA Screening - n/a    Falls screening - n/a    Current Outpatient Medications   Medication Sig Dispense Refill    Semaglutide,0.25 or 0.5MG/DOS, (OZEMPIC, 0.25 OR 0.5 MG/DOSE,) 2 MG/1.5ML SOPN Inject into the skin 50 Units once a week      ketoconazole (NIZORAL) 2 % shampoo Apply topically daily as needed.  120 mL 1    etonogestrel (NEXPLANON) 68 MG implant Inject 68 mg into the skin once       No current facility-administered medications for this visit. No orders of the defined types were placed in this encounter. All medications reviewed and reconciled, including OTC and herbal medications. Updated list given to patient. Patient Active Problem List   Diagnosis    Skin lesion of back    S/P tonsillectomy and adenoidectomy    Adenoid hypertrophy    Lymphoid hyperplasia    Acne    Bronchitis    Depressive disorder    Disorder of eyeball    Drug overdose    Genital herpes    Obesity with body mass index 30 or greater    Pain of foot    Class 2 obesity due to excess calories without serious comorbidity with body mass index (BMI) of 36.0 to 36.9 in adult    Tachycardia, unspecified    Tobacco dependence due to cigarettes    Upper respiratory tract infection    History of polysubstance abuse ([facility] [address]    Major depressive disorder in full remission [facility])       Past Medical History:   Diagnosis Date    Bipolar disorder ([facility] [address]     Pt states dx in     Depressive disorder 2011    Simple obesity 2019    Tobacco abuse        Past Surgical History:   Procedure Laterality Date    ADENOIDECTOMY  1/15/2016    BLADDER REPAIR      Pt states bladder construction when she was 1 y.o.     ELBOW SURGERY  2010    left   arthroscopy    SEPTOPLASTY  1/15/2016    Turbinoplasties    TONSILLECTOMY      at 14years of age    [address]      Pt states had this at 14 y.o.    [address] EXTRACTION         No Known Allergies    Social History     Socioeconomic History    Marital status: Single     Spouse name: Not on file    Number of children: Not on file    Years of education: Not on file    Highest education level: Not on file   Occupational History    Not on file   Tobacco Use    Smoking status: Former     Packs/day: 0.50     Years: 6.00     Pack years: 3.00     Types: Cigarettes     Start date: 2007     Quit date: 10/13/2014     Years since quittin.0 Smokeless tobacco: Never   Substance and Sexual Activity    Alcohol use: No     Alcohol/week: 0.0 standard drinks    Drug use: Not Currently     Comment: heroin- last use 2017    Sexual activity: Never   Other Topics Concern    Not on file   Social History Narrative    Not on file     Social Determinants of Health     Financial Resource Strain: Low Risk     Difficulty of Paying Living Expenses: Not very hard   Food Insecurity: No Food Insecurity    Worried About Running Out of Food in the Last Year: Never true    Ran Out of Food in the Last Year: Never true   Transportation Needs: Not on file   Physical Activity: Insufficiently Active    Days of Exercise per Week: 2 days    Minutes of Exercise per Session: 60 min   Stress: Not on file   Social Connections: Not on file   Intimate Partner Violence: Not At Risk    Fear of Current or Ex-Partner: No    Emotionally Abused: No    Physically Abused: No    Sexually Abused: No   Housing Stability: Not on file       Family History   Problem Relation Age of Onset    Bipolar Disorder Mother     Schizophrenia Mother     COPD Mother     Emphysema Mother     Other Father         Nodules in lungs due to painting for years. COPD Maternal Grandmother     Emphysema Maternal Grandmother     Other Maternal Grandfather         Pt states maternal grandpa  during heart procedure. Rheum Arthritis Paternal Grandmother          I have reviewed the patient's past medical history, past surgical history, allergies, medications, social and family history and I have made updates where appropriate.       Review of Systems  Positive responses are highlighted in bold    Constitutional:  Fever, Chills, Night Sweats, Fatigue, Unexpected changes in weight  Eyes:  Eye discharge, Eye pain, Eye redness, Visual disturbances   HENT:  Ear pain, Tinnitus, Nosebleeds, Trouble swallowing, Hearing loss, Sore throat  Cardiovascular:  Chest Pain, Palpitations, Orthopnea, Paroxysmal Nocturnal Dyspnea  Respiratory:  Cough, Wheezing, Shortness of breath, Chest tightness, Apnea  Gastrointestinal:  Nausea, Vomiting, Diarrhea, Constipation, Heartburn, Blood in stool  Genitourinary:  Difficulty or painful urination, Flank pain, Change in frequency, Urgency  Skin:  Color change, Rash, Itching, Wound  Psychiatric:  Hallucinations, Anxiety, Depression, Suicidal ideation  Hematological:  Enlarged glands, Easy bleeding, Easily bruising  Musculoskeletal:  Joint pain, Back pain, Gait problems, Joint swelling, Myalgias  Neurological:  Dizziness, Headaches, Presyncope, Numbness, Seizures, Tremors  Allergy:  Environmental allergies, Food allergies  Endocrine:  Heat Intolerance, Cold Intolerance, Polydipsia, Polyphagia, Polyuria      PHYSICAL EXAM:  Vitals:    10/20/22 0854   BP: 116/68   Pulse: 88   Resp: 14   Temp: 98.3 °F (36.8 °C)   TempSrc: Oral   SpO2: 98%   Weight: 181 lb 12.8 oz (82.5 kg)   Height: 5' 3\" (1.6 m)     Body mass index is 32.2 kg/m². VS Reviewed  General Appearance: A&O x 3, No acute distress,well developed and well- nourished  Head: normocephalic and atraumatic  Eyes: pupils equal, round, and reactive to light, extraocular eye movements intact, conjunctivae and eye lids without erythema  Neck: supple and non-tender without mass, no thyromegaly or thyroid nodules, no cervical lymphadenopathy  Pulmonary/Chest: clear to auscultation bilaterally- no wheezes, rales or rhonchi, normal air movement, no respiratory distress or retractions  Cardiovascular: S1 and S2 auscultated w/ RRR. No murmurs, rubs, clicks, or gallops, distal pulses intact. Abdomen: soft, non-tender, non-distended, bowl sounds physiologic,  no rebound or guarding, no masses or hernias noted. Liver and spleen without enlargement.    Extremities: no cyanosis, clubbing or edema of the lower extremities  Musculoskeletal: No joint swelling or gross deformity   Neuro:  Alert, 5/5 strength globally and symmetrically  Psych: Affect appropriate. Mood normal. Thought process is normal without evidence of depression or psychosis. Good insight and appropriate interaction. Cognition and memory appear to be intact. Skin: warm and dry, no rash on exposed skin  Lymph:  No cervical, auricular or supraclavicular lymph nodes palpated    ASSESSMENT & PLAN  Darin Moran was seen today for other. Diagnoses and all orders for this visit:    Herpes zoster without complication  -     Comprehensive Metabolic Panel; Future  -     CBC with Auto Differential; Future  -     HIV Screen; Future  -     Hepatitis C Antibody; Future    History of intravenous drug use in remission  -     Comprehensive Metabolic Panel; Future  -     CBC with Auto Differential; Future  -     HIV Screen; Future  -     Hepatitis C Antibody; Future    - question if the rash was truly shingles because she did not have direct contact with the rash  - however, may be beneficial to rule out any immunosuppressive condition  - obtain labs  - rec'd she come in for office appt if she develops rash again    DISPOSITION    Return if symptoms worsen or fail to improve. Darin Moran released without restrictions. PATIENT COUNSELING    Counseling was provided today regarding the following topics: Healthy eating habits, Regular exercise, substance abuse and healthy sleep habits. Darin Moran received counseling on the following healthy behaviors: nutrition and exercise    Patient given educational materials on: See Attached    I have instructed Darin Moran to complete a self tracking handout on none and instructed them to bring it with them to her next appointment. Barriers to learning and self management: none    Discussed use, benefit, and side effects of prescribed medications. Barriers to medication compliance addressed. All patient questions answered. Pt voiced understanding.        Electronically signed by JESSEE Johns CNP on 10/20/2022 at 9:19 AM

## 2022-10-21 ENCOUNTER — TELEPHONE (OUTPATIENT)
Dept: FAMILY MEDICINE CLINIC | Age: 32
End: 2022-10-21

## 2022-10-21 NOTE — TELEPHONE ENCOUNTER
----- Message from JESSEE Sloan CNP sent at 10/21/2022  7:31 AM EDT -----  Let Lindsay Shabazz know her labs are all normal. Kidney function completely normal, negative HIV and Hep C.

## 2022-11-18 ENCOUNTER — HOSPITAL ENCOUNTER (EMERGENCY)
Age: 32
Discharge: HOME | End: 2022-11-18
Payer: COMMERCIAL

## 2022-11-18 VITALS
DIASTOLIC BLOOD PRESSURE: 88 MMHG | SYSTOLIC BLOOD PRESSURE: 127 MMHG | RESPIRATION RATE: 18 BRPM | HEART RATE: 86 BPM | OXYGEN SATURATION: 100 % | TEMPERATURE: 98.6 F

## 2022-11-18 DIAGNOSIS — Z77.21 EXPOSURE TO BLOODBORNE PATHOGEN: Primary | ICD-10-CM

## 2022-11-18 LAB
HBV SURFACE AB TITR SER: POSITIVE {TITER}
HEPATITIS C ANTIBODY: NEGATIVE
HIV AG/AB: NONREACTIVE

## 2022-11-18 PROCEDURE — 87389 HIV-1 AG W/HIV-1&-2 AB AG IA: CPT

## 2022-11-18 PROCEDURE — 90471 IMMUNIZATION ADMIN: CPT

## 2022-11-18 PROCEDURE — 90714 TD VACC NO PRESV 7 YRS+ IM: CPT

## 2022-11-18 PROCEDURE — 86803 HEPATITIS C AB TEST: CPT

## 2022-11-18 PROCEDURE — 6360000002 HC RX W HCPCS

## 2022-11-18 PROCEDURE — 99284 EMERGENCY DEPT VISIT MOD MDM: CPT | Performed by: NURSE PRACTITIONER

## 2022-11-18 PROCEDURE — 86706 HEP B SURFACE ANTIBODY: CPT

## 2022-11-18 RX ORDER — TETANUS AND DIPHTHERIA TOXOIDS ADSORBED 2; 2 [LF]/.5ML; [LF]/.5ML
INJECTION INTRAMUSCULAR
Status: COMPLETED
Start: 2022-11-18 | End: 2022-11-18

## 2022-11-18 RX ORDER — TETANUS AND DIPHTHERIA TOXOIDS ADSORBED 2; 2 [LF]/.5ML; [LF]/.5ML
0.5 INJECTION INTRAMUSCULAR ONCE
Status: COMPLETED | OUTPATIENT
Start: 2022-11-18 | End: 2022-11-18

## 2022-11-18 RX ADMIN — TETANUS AND DIPHTHERIA TOXOIDS ADSORBED 0.5 ML: 2; 2 INJECTION INTRAMUSCULAR at 02:08

## 2022-11-18 ASSESSMENT — ENCOUNTER SYMPTOMS: COLOR CHANGE: 0

## 2022-11-18 ASSESSMENT — PAIN - FUNCTIONAL ASSESSMENT: PAIN_FUNCTIONAL_ASSESSMENT: NONE - DENIES PAIN

## 2022-11-18 NOTE — ED PROVIDER NOTES
[facility] Emergency Department    CHIEF COMPLAINT       Chief Complaint   Patient presents with    Other     Stuck with needle       Nurses Notes reviewed and I agree except as noted in the HPI. HISTORY OF PRESENT ILLNESS    Valerie Echavarria is a 34 y.o. female who presents to the ED for evaluation of needlestick injury. 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 When she was poked by something. She notes she searched the bag to find a needle, but found no needle. The only sharp objects she could find was a spike for intravenous fluids. She notes that there was also multiple other bloody and contaminated items in the biohazard bag. She notes that she was stuck in her right index finger, and she did draw some blood but she denies any wound to the area currently. She notes that this accident happened immediately before coming to the ER. She denies any significant medical history. She denies any pain associated with injury. She is unsure of her last tetanus vaccine but believes it was greater than 5 years. She notes she has been vaccinated for hepatitis B.        HPI was provided by the patient. REVIEW OF SYSTEMS     Review of Systems   Skin:  Positive for wound. Negative for color change. Allergic/Immunologic: Negative for immunocompromised state. Hematological:  Does not bruise/bleed easily. PAST MEDICAL HISTORY     Past Medical History:   Diagnosis Date    Bipolar disorder ([facility] [address]     Pt states dx in 2009    Depressive disorder 5/2/2011    Simple obesity 11/11/2019    Tobacco abuse        SURGICALHISTORY      has a past surgical history that includes bladder repair; Tonsillectomy and Adenoidectomy; Elbow surgery (2010); Tonsillectomy; [place] tooth extraction (2015); Septoplasty (1/15/2016); and Adenoidectomy (1/15/2016).     CURRENT MEDICATIONS       Discharge Medication List as of 11/18/2022  4:14 AM        CONTINUE these medications which have NOT CHANGED    Details   Semaglutide,0.25 or 0.5MG/DOS, (OZEMPIC, 0.25 OR 0.5 MG/DOSE,) 2 MG/1.5ML SOPN Inject into the skin 50 Units once a weekHistorical Med      ketoconazole (NIZORAL) 2 % shampoo Apply topically daily as needed. , Disp-120 mL, R-1, Normal      etonogestrel (NEXPLANON) 68 MG implant Inject 68 mg into the skin once             ALLERGIES     has No Known Allergies. FAMILY HISTORY     She indicated that her mother is . She indicated that her father is alive. She indicated that her maternal grandmother is . She indicated that her maternal grandfather is . She indicated that her paternal grandmother is alive. She indicated that her paternal grandfather is alive. family history includes Bipolar Disorder in her mother; COPD in her maternal grandmother and mother; Emphysema in her maternal grandmother and mother; Other in her father and maternal grandfather; Rheum Arthritis in her paternal grandmother; Schizophrenia in her mother.     SOCIAL HISTORY       Social History     Socioeconomic History    Marital status: Single     Spouse name: Not on file    Number of children: Not on file    Years of education: Not on file    Highest education level: Not on file   Occupational History    Not on file   Tobacco Use    Smoking status: Former     Packs/day: 0.50     Years: 6.00     Pack years: 3.00     Types: Cigarettes     Start date: 2007     Quit date: 10/13/2014     Years since quittin.1    Smokeless tobacco: Never   Substance and Sexual Activity    Alcohol use: No     Alcohol/week: 0.0 standard drinks    Drug use: Not Currently     Comment: heroin- last use 2017    Sexual activity: Never   Other Topics Concern    Not on file   Social History Narrative    Not on file     Social Determinants of Health     Financial Resource Strain: Low Risk     Difficulty of Paying Living Expenses: Not very hard   Food Insecurity: No Food Insecurity    Worried About Running Out of Food in the Last Year: Never true    Ran Out of Food in the Last Year: Never true   Transportation Needs: Not on file   Physical Activity: Insufficiently Active    Days of Exercise per Week: 2 days    Minutes of Exercise per Session: 60 min   Stress: Not on file   Social Connections: Not on file   Intimate Partner Violence: Not At Risk    Fear of Current or Ex-Partner: No    Emotionally Abused: No    Physically Abused: No    Sexually Abused: No   Housing Stability: Not on file       PHYSICAL EXAM     INITIAL VITALS:  oral temperature is 98.6 °F (37 °C). Her blood pressure is 127/88 and her pulse is 86. Her respiration is 18 and oxygen saturation is 100%. Physical Exam  Vitals and nursing note reviewed. Constitutional:       General: She is not in acute distress. Appearance: Normal appearance. She is normal weight. HENT:      Head: Normocephalic. Mouth/Throat:      Mouth: Mucous membranes are moist.   Eyes:      Extraocular Movements: Extraocular movements intact. Cardiovascular:      Pulses: Normal pulses. Pulmonary:      Effort: Pulmonary effort is normal.   Skin:     General: Skin is warm and dry. Findings: No wound. Neurological:      General: No focal deficit present. Mental Status: She is alert and oriented to person, place, and time. Psychiatric:         Mood and Affect: Mood normal.         Behavior: Behavior normal.         Thought Content: Thought content normal.       DIFFERENTIAL DIAGNOSIS:   Blood-borne pathogen exposure    DIAGNOSTIC RESULTS       RADIOLOGY: non-plainfilm images(s) such as CT, Ultrasound and MRI are read by the radiologist.  Plain radiographic images are visualized and preliminarily interpreted by the emergency physician unless otherwise stated below.   No orders to display         LABS:   Labs Reviewed   HEPATITIS B SURFACE ANTIBODY   HEPATITIS C ANTIBODY   HIV SCREEN       EMERGENCY DEPARTMENT COURSE:   Vitals:    Vitals: 11/18/22 0159   BP: 127/88   Pulse: 86   Resp: 18   Temp: 98.6 °F (37 °C)   TempSrc: Oral   SpO2: 100%     MDM    Patient was seen and evaluated in the emergency department, patient appeared to be in no acute distress, vital signs reviewed, no significant findings are noted. Physical exam was completed, no significant abnormality noted. No significant wound noted from fingerstick injury. No continued bleeding, no wound. Patient has unknown source of injury. If item did penetrate her skin and it had to go through a bag to do so, we discussed that the likelihood of developing a infection such as HIV associated with this injury. She was offered PEP, but she defers at this time. 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 She is given their contact information told to make an appointment the next business day. She verbalized understanding. While here in the ER she maintained stable course appropriate for discharge. Medications   diptheria-tetanus toxoids [facility]) 2-2 LF/0.5ML injection 0.5 mL (0.5 mLs IntraMUSCular Given 11/18/22 0208)       Patient was seenindependently by myself. The patient's final impression and disposition and plan was determined by myself. CRITICAL CARE:   None    CONSULTS:  None    PROCEDURES:  None    FINAL IMPRESSION     1.  Exposure to bloodborne pathogen          DISPOSITION/PLAN   Patient discharged  PATIENT REFERREDTO:  [address] [place] [id number]  [phone]  Call   For follow up and evaluation    DISCHARGE MEDICATIONS:  Discharge Medication List as of 11/18/2022  4:14 AM          (Please note that portions of this note were completed with a voice recognition program.  Efforts were made to edit the dictations but occasionally words are mis-transcribed.)        Provider:  I personally performed the services described in the documentation,reviewed and edited the documentation which was dictated to the scribe in my presence, and it accurately records my words and actions.     Nilson Sumner, CNP 11/18/22 5:33 AM    Shahzad Field Counts, APRN - CNP         [employer] [facility], APRN - CNP  11/18/22 3671

## 2022-11-18 NOTE — ED TRIAGE NOTES
Was sent in tonight from work , with a puncture wound from a unknown source. Thinks it was a iv bag tubing but not sure area was bleeding. Came in for testing.

## 2022-12-05 ENCOUNTER — OFFICE VISIT (OUTPATIENT)
Dept: FAMILY MEDICINE CLINIC | Age: 32
End: 2022-12-05
Payer: COMMERCIAL

## 2022-12-05 VITALS
OXYGEN SATURATION: 98 % | RESPIRATION RATE: 14 BRPM | BODY MASS INDEX: 30.79 KG/M2 | TEMPERATURE: 97.9 F | WEIGHT: 173.8 LBS | HEIGHT: 63 IN | DIASTOLIC BLOOD PRESSURE: 64 MMHG | HEART RATE: 92 BPM | SYSTOLIC BLOOD PRESSURE: 112 MMHG

## 2022-12-05 DIAGNOSIS — F19.91 HISTORY OF INTRAVENOUS DRUG USE IN REMISSION: ICD-10-CM

## 2022-12-05 DIAGNOSIS — B02.9 HERPES ZOSTER WITHOUT COMPLICATION: ICD-10-CM

## 2022-12-05 DIAGNOSIS — J02.9 ACUTE PHARYNGITIS, UNSPECIFIED ETIOLOGY: Primary | ICD-10-CM

## 2022-12-05 PROBLEM — J06.9 UPPER RESPIRATORY TRACT INFECTION: Status: RESOLVED | Noted: 2017-11-27 | Resolved: 2022-12-05

## 2022-12-05 LAB — STREPTOCOCCUS A RNA: NEGATIVE

## 2022-12-05 PROCEDURE — 1036F TOBACCO NON-USER: CPT | Performed by: NURSE PRACTITIONER

## 2022-12-05 PROCEDURE — G8427 DOCREV CUR MEDS BY ELIG CLIN: HCPCS | Performed by: NURSE PRACTITIONER

## 2022-12-05 PROCEDURE — G8484 FLU IMMUNIZE NO ADMIN: HCPCS | Performed by: NURSE PRACTITIONER

## 2022-12-05 PROCEDURE — G8417 CALC BMI ABV UP PARAM F/U: HCPCS | Performed by: NURSE PRACTITIONER

## 2022-12-05 PROCEDURE — 99213 OFFICE O/P EST LOW 20 MIN: CPT | Performed by: NURSE PRACTITIONER

## 2022-12-05 PROCEDURE — 87651 STREP A DNA AMP PROBE: CPT | Performed by: NURSE PRACTITIONER

## 2022-12-05 NOTE — PROGRESS NOTES
SUBJECTIVE:  Curry Bunch is a 34 y.o. y/o female that presents with Fever (Off and on, sore throat and headache started Thursday night )    HPI:      Symptoms have been present for 5 day(s). Fever? Yes - low grade  Odynophagia? Yes  Dysphagia? No  Cough? No  Rhinitis? No  Hx of EBV? No  Sick Contacts? No    Pt denies SOB, wheezing, stridor, nausea or vomiting. She has had a headache/      OBJECTIVE:  /64   Pulse 92   Temp 97.9 °F (36.6 °C) (Oral)   Resp 14   Ht 5' 3\" (1.6 m)   Wt 173 lb 12.8 oz (78.8 kg)   SpO2 98%   BMI 30.79 kg/m²   Physical Examination:   General appearance - alert, well appearing, and in no distress  Ears - bilateral TM's and external ear canals normal  Nose - normal and patent, no erythema, discharge or polyps  Mouth - erythematous, tonsils absent, dental hygiene good, tongue normal, and throat culture obtained  Neck - supple, no significant adenopathy  Chest - clear to auscultation, no wheezes, rales or rhonchi, symmetric air entry  Heart - normal rate, regular rhythm, normal S1, S2, no murmurs, rubs, clicks or gallops  Abdomen - soft, nontender, nondistended, no masses or organomegaly  Musculoskeletal - no joint tenderness, deformity or swelling  Skin exam - normal coloration and turgor, no rashes, no suspicious skin lesions noted. Rapid Strep Performed today was negative. ASSESSMENT & PLAN  Brittany Ocampo was seen today for fever. Diagnoses and all orders for this visit:    Acute pharyngitis, unspecified etiology  -     POCT Rapid Strep A DNA ([facility] i)  -     Culture, Throat; Future    - negative strep  - send for culture  - tylenol/motrin, salt water gargles  - work slip    Return if symptoms worsen or fail to improve.    -Patient advised to call immediately or go to ER if any worsening of symptoms  -Patient counseled on conservative care including fluids, rest and OTC meds    Tonsillar Exudate? no  Tender, Anterior Adenopathy? no  Cough Absent?  yes  Fever present?   yes    (+1 if younger than 15, -1 if over 45)    Strep Score:    (4) - Tx with Abx  (2-3)  Rapid Strep +=Abx, Neg=Cltx  (0-1)  Symptomatic Treatment

## 2022-12-05 NOTE — LETTER
[address] [facility]  [address] [place] [id number]  Phone: [phone]  Fax: [phone]    JESSEE Tierney CNP        December 5, 2022     Patient: Jamel Burotn   YOB: 1990   Date of Visit: 12/5/2022       To Whom it May Concern: Jamel Burton was seen in my clinic on 12/5/2022. She may return to work on 12/6/22. If you have any questions or concerns, please don't hesitate to call.     Sincerely,         JESSEE Tierney CNP

## 2022-12-07 ENCOUNTER — TELEPHONE (OUTPATIENT)
Dept: FAMILY MEDICINE CLINIC | Age: 32
End: 2022-12-07

## 2022-12-07 LAB — THROAT/NOSE CULTURE: NORMAL

## 2022-12-07 NOTE — TELEPHONE ENCOUNTER
Patient informed and verbalized understanding.  Patient states she is starting to feel better, throat doesn't hurt as bad, she is still having head pressure

## 2022-12-07 NOTE — TELEPHONE ENCOUNTER
I'm ok to con't to hold off on Abx as long as she is improving. Call back though if anything changes.

## 2022-12-07 NOTE — TELEPHONE ENCOUNTER
----- Message from JESSEE Ho CNP sent at 12/7/2022  7:38 AM EST -----  Let Juan M Boyd know her throat culture came back normal, no growth of bacteria. How is she feeling? Any improvement?

## 2023-01-03 DIAGNOSIS — B00.1 COLD SORE: ICD-10-CM

## 2023-01-04 RX ORDER — VALACYCLOVIR HYDROCHLORIDE 1 G/1
TABLET, FILM COATED ORAL
Qty: 6 TABLET | Refills: 1 | Status: SHIPPED | OUTPATIENT
Start: 2023-01-04

## 2023-01-04 NOTE — TELEPHONE ENCOUNTER
Recent Visits  Date Type Provider Dept   12/05/22 Office Visit Delman Ormond, APRN - CNP [facility]   10/20/22 Office Visit Delman Ormond, APRN - CNP [facility]   06/15/22 Office Visit Delman Ormond, APRN - CNP [facility]   Showing recent visits within past 540 days with a meds authorizing provider and meeting all other requirements  Future Appointments  No visits were found meeting these conditions. Showing future appointments within next 150 days with a meds authorizing provider and meeting all other requirements     No future appointments.

## 2023-05-15 DIAGNOSIS — B00.1 COLD SORE: ICD-10-CM

## 2023-05-15 RX ORDER — VALACYCLOVIR HYDROCHLORIDE 1 G/1
TABLET, FILM COATED ORAL
Qty: 6 TABLET | Refills: 2 | Status: SHIPPED | OUTPATIENT
Start: 2023-05-15

## 2023-06-19 ENCOUNTER — NURSE ONLY (OUTPATIENT)
Dept: LAB | Age: 33
End: 2023-06-19

## 2023-06-23 LAB — CYTOLOGY THIN PREP PAP: NORMAL

## 2023-08-02 ENCOUNTER — OFFICE VISIT (OUTPATIENT)
Dept: FAMILY MEDICINE CLINIC | Age: 33
End: 2023-08-02
Payer: COMMERCIAL

## 2023-08-02 VITALS
HEIGHT: 63 IN | WEIGHT: 173 LBS | BODY MASS INDEX: 30.65 KG/M2 | DIASTOLIC BLOOD PRESSURE: 80 MMHG | RESPIRATION RATE: 18 BRPM | TEMPERATURE: 97.7 F | HEART RATE: 76 BPM | SYSTOLIC BLOOD PRESSURE: 126 MMHG | OXYGEN SATURATION: 99 %

## 2023-08-02 DIAGNOSIS — L01.00 IMPETIGO: ICD-10-CM

## 2023-08-02 DIAGNOSIS — R73.03 PREDIABETES: Primary | ICD-10-CM

## 2023-08-02 DIAGNOSIS — F32.5 MAJOR DEPRESSIVE DISORDER IN FULL REMISSION, UNSPECIFIED WHETHER RECURRENT (HCC): ICD-10-CM

## 2023-08-02 DIAGNOSIS — F19.10 POLYSUBSTANCE ABUSE (HCC): ICD-10-CM

## 2023-08-02 LAB
AVERAGE GLUCOSE: 97 MG/DL
HBA1C MFR BLD: 5 % (ref 4.2–5.6)

## 2023-08-02 PROCEDURE — 99214 OFFICE O/P EST MOD 30 MIN: CPT | Performed by: NURSE PRACTITIONER

## 2023-08-02 SDOH — ECONOMIC STABILITY: HOUSING INSECURITY
IN THE LAST 12 MONTHS, WAS THERE A TIME WHEN YOU DID NOT HAVE A STEADY PLACE TO SLEEP OR SLEPT IN A SHELTER (INCLUDING NOW)?: NO

## 2023-08-02 SDOH — ECONOMIC STABILITY: TRANSPORTATION INSECURITY
IN THE PAST 12 MONTHS, HAS LACK OF TRANSPORTATION KEPT YOU FROM MEETINGS, WORK, OR FROM GETTING THINGS NEEDED FOR DAILY LIVING?: NO

## 2023-08-02 SDOH — ECONOMIC STABILITY: INCOME INSECURITY: HOW HARD IS IT FOR YOU TO PAY FOR THE VERY BASICS LIKE FOOD, HOUSING, MEDICAL CARE, AND HEATING?: NOT HARD AT ALL

## 2023-08-02 SDOH — ECONOMIC STABILITY: FOOD INSECURITY: WITHIN THE PAST 12 MONTHS, YOU WORRIED THAT YOUR FOOD WOULD RUN OUT BEFORE YOU GOT MONEY TO BUY MORE.: NEVER TRUE

## 2023-08-02 SDOH — ECONOMIC STABILITY: FOOD INSECURITY: WITHIN THE PAST 12 MONTHS, THE FOOD YOU BOUGHT JUST DIDN'T LAST AND YOU DIDN'T HAVE MONEY TO GET MORE.: NEVER TRUE

## 2023-08-02 ASSESSMENT — PATIENT HEALTH QUESTIONNAIRE - PHQ9
SUM OF ALL RESPONSES TO PHQ9 QUESTIONS 1 & 2: 0
7. TROUBLE CONCENTRATING ON THINGS, SUCH AS READING THE NEWSPAPER OR WATCHING TELEVISION: NOT AT ALL
SUM OF ALL RESPONSES TO PHQ QUESTIONS 1-9: 2
3. TROUBLE FALLING OR STAYING ASLEEP: SEVERAL DAYS
4. FEELING TIRED OR HAVING LITTLE ENERGY: SEVERAL DAYS
2. FEELING DOWN, DEPRESSED OR HOPELESS: 0
6. FEELING BAD ABOUT YOURSELF - OR THAT YOU ARE A FAILURE OR HAVE LET YOURSELF OR YOUR FAMILY DOWN: NOT AT ALL
3. TROUBLE FALLING OR STAYING ASLEEP: 1
SUM OF ALL RESPONSES TO PHQ QUESTIONS 1-9: 2
4. FEELING TIRED OR HAVING LITTLE ENERGY: 1
9. THOUGHTS THAT YOU WOULD BE BETTER OFF DEAD, OR OF HURTING YOURSELF: NOT AT ALL
7. TROUBLE CONCENTRATING ON THINGS, SUCH AS READING THE NEWSPAPER OR WATCHING TELEVISION: 0
SUM OF ALL RESPONSES TO PHQ QUESTIONS 1-9: 2
1. LITTLE INTEREST OR PLEASURE IN DOING THINGS: 0
8. MOVING OR SPEAKING SO SLOWLY THAT OTHER PEOPLE COULD HAVE NOTICED. OR THE OPPOSITE, BEING SO FIGETY OR RESTLESS THAT YOU HAVE BEEN MOVING AROUND A LOT MORE THAN USUAL: 0
10. IF YOU CHECKED OFF ANY PROBLEMS, HOW DIFFICULT HAVE THESE PROBLEMS MADE IT FOR YOU TO DO YOUR WORK, TAKE CARE OF THINGS AT HOME, OR GET ALONG WITH OTHER PEOPLE: 1
SUM OF ALL RESPONSES TO PHQ QUESTIONS 1-9: 2
8. MOVING OR SPEAKING SO SLOWLY THAT OTHER PEOPLE COULD HAVE NOTICED. OR THE OPPOSITE - BEING SO FIDGETY OR RESTLESS THAT YOU HAVE BEEN MOVING AROUND A LOT MORE THAN USUAL: NOT AT ALL
2. FEELING DOWN, DEPRESSED OR HOPELESS: NOT AT ALL
SUM OF ALL RESPONSES TO PHQ QUESTIONS 1-9: 2
5. POOR APPETITE OR OVEREATING: 0
5. POOR APPETITE OR OVEREATING: NOT AT ALL
6. FEELING BAD ABOUT YOURSELF - OR THAT YOU ARE A FAILURE OR HAVE LET YOURSELF OR YOUR FAMILY DOWN: 0
1. LITTLE INTEREST OR PLEASURE IN DOING THINGS: NOT AT ALL
10. IF YOU CHECKED OFF ANY PROBLEMS, HOW DIFFICULT HAVE THESE PROBLEMS MADE IT FOR YOU TO DO YOUR WORK, TAKE CARE OF THINGS AT HOME, OR GET ALONG WITH OTHER PEOPLE: SOMEWHAT DIFFICULT
9. THOUGHTS THAT YOU WOULD BE BETTER OFF DEAD, OR OF HURTING YOURSELF: 0

## 2023-08-03 ENCOUNTER — TELEPHONE (OUTPATIENT)
Dept: FAMILY MEDICINE CLINIC | Age: 33
End: 2023-08-03

## 2023-08-03 LAB
ALBUMIN SERPL-MCNC: 4.5 G/DL (ref 3.5–5.2)
ALK PHOSPHATASE: 43 U/L (ref 40–114)
ALT SERPL-CCNC: 6 U/L (ref 5–40)
ANION GAP SERPL CALCULATED.3IONS-SCNC: 8 MEQ/L (ref 7–16)
AST SERPL-CCNC: 11 U/L (ref 9–40)
BILIRUB SERPL-MCNC: 0.4 MG/DL
BUN BLDV-MCNC: 11 MG/DL (ref 6–20)
CALCIUM SERPL-MCNC: 9.3 MG/DL (ref 8.5–10.5)
CHLORIDE BLD-SCNC: 106 MEQ/L (ref 95–107)
CHOLESTEROL/HDL RATIO: 3.2 RATIO
CHOLESTEROL: 160 MG/DL
CO2: 27 MEQ/L (ref 19–31)
CREAT SERPL-MCNC: 0.84 MG/DL (ref 0.6–1.3)
CREATINE, URINE: 197.5 MG/DL
EGFR IF NONAFRICAN AMERICAN: 94 ML/MIN/1.73
GLUCOSE: 81 MG/DL (ref 70–99)
HDLC SERPL-MCNC: 50 MG/DL
LDL CHOLESTEROL CALCULATED: 103 MG/DL
LDL/HDL RATIO: 2.1 RATIO
MICROALBUMIN/CREAT 24H UR: <1.2 MG/DL
MICROALBUMIN/CREAT UR-RTO: NORMAL MG/G
POTASSIUM SERPL-SCNC: 4.2 MEQ/L (ref 3.5–5.4)
SODIUM BLD-SCNC: 141 MEQ/L (ref 133–146)
TOTAL PROTEIN: 6.3 G/DL (ref 6.1–8.3)
TRIGL SERPL-MCNC: 36 MG/DL
VLDLC SERPL CALC-MCNC: 7 MG/DL

## 2023-08-03 NOTE — TELEPHONE ENCOUNTER
----- Message from JESSEE Paige - CNP sent at 8/3/2023  7:36 AM EDT -----  Let Toya Goetz know her labs are all stable and in normal ranges.

## 2023-08-15 ENCOUNTER — NURSE ONLY (OUTPATIENT)
Dept: LAB | Age: 33
End: 2023-08-15

## 2023-09-23 ENCOUNTER — HOSPITAL ENCOUNTER (EMERGENCY)
Age: 33
Discharge: HOME | End: 2023-09-23
Payer: COMMERCIAL

## 2023-09-23 VITALS
SYSTOLIC BLOOD PRESSURE: 131 MMHG | DIASTOLIC BLOOD PRESSURE: 58 MMHG | HEART RATE: 85 BPM | TEMPERATURE: 98.2 F | OXYGEN SATURATION: 100 % | RESPIRATION RATE: 20 BRPM

## 2023-09-23 DIAGNOSIS — J02.9 ACUTE PHARYNGITIS, UNSPECIFIED ETIOLOGY: Primary | ICD-10-CM

## 2023-09-23 DIAGNOSIS — F17.200 SMOKER: ICD-10-CM

## 2023-09-23 LAB — S PYO AG THROAT QL: NEGATIVE

## 2023-09-23 PROCEDURE — 87651 STREP A DNA AMP PROBE: CPT

## 2023-09-23 PROCEDURE — 99213 OFFICE O/P EST LOW 20 MIN: CPT

## 2023-09-23 RX ORDER — BENZONATATE 200 MG/1
200 CAPSULE ORAL 3 TIMES DAILY PRN
Qty: 21 CAPSULE | Refills: 0 | Status: SHIPPED | OUTPATIENT
Start: 2023-09-23 | End: 2023-09-30

## 2023-09-23 RX ORDER — AMOXICILLIN 500 MG/1
500 CAPSULE ORAL 2 TIMES DAILY
Qty: 20 CAPSULE | Refills: 0 | Status: SHIPPED | OUTPATIENT
Start: 2023-09-23 | End: 2023-10-03

## 2023-09-23 ASSESSMENT — ENCOUNTER SYMPTOMS
ALLERGIC/IMMUNOLOGIC NEGATIVE: 1
ABDOMINAL PAIN: 0
VOICE CHANGE: 1
SHORTNESS OF BREATH: 0
VOMITING: 0
EYE PAIN: 0
WHEEZING: 0
EYE REDNESS: 0
SORE THROAT: 1
EYE DISCHARGE: 0
COUGH: 1
DIARRHEA: 0
CONSTIPATION: 0
NAUSEA: 0
BACK PAIN: 0
RHINORRHEA: 0
TROUBLE SWALLOWING: 0

## 2023-09-23 NOTE — ED PROVIDER NOTES
[address]  Urgent Care Encounter      CHIEF COMPLAINT       Chief Complaint   Patient presents with    Pharyngitis       Nurses Notes reviewed and I agree except as noted in the HPI. HISTORY OF PRESENT ILLNESS   Saira Penny is a 28 y.o. female who presents 2 days of cough, congestion, sore throat, chills, productive cough of yellow. Denies fever, otalgia, headache, nausea vomiting or diarrhea. REVIEW OF SYSTEMS     Review of Systems   Constitutional:  Positive for chills and fatigue. Negative for activity change and fever. HENT:  Positive for congestion, sore throat and voice change. Negative for ear pain, rhinorrhea and trouble swallowing. Eyes:  Negative for pain, discharge and redness. Respiratory:  Positive for cough. Negative for shortness of breath and wheezing. Cardiovascular: Negative. Gastrointestinal:  Negative for abdominal pain, constipation, diarrhea, nausea and vomiting. Endocrine: Negative. Genitourinary:  Negative for dysuria, frequency and urgency. Musculoskeletal:  Negative for arthralgias, back pain and myalgias. Skin:  Negative for rash. Allergic/Immunologic: Negative. Neurological:  Negative for dizziness, tremors, weakness and headaches. Hematological: Negative. Psychiatric/Behavioral:  Negative for dysphoric mood and sleep disturbance. The patient is not nervous/anxious. PAST MEDICAL HISTORY         Diagnosis Date    Bipolar disorder ([address])     Pt states dx in 2009    Depressive disorder 5/2/2011    Simple obesity 11/11/2019    Tobacco abuse        SURGICAL HISTORY     Patient  has a past surgical history that includes bladder repair; Tonsillectomy and Adenoidectomy; Elbow surgery (2010); Tonsillectomy; Strawn tooth extraction (2015); Septoplasty (1/15/2016); and Adenoidectomy (1/15/2016).     CURRENT MEDICATIONS       Previous Medications    ETONOGESTREL (NEXPLANON) 68 MG IMPLANT    Inject 68 mg into the skin once

## 2023-11-26 PROBLEM — A60.00 GENITAL HERPES: Status: RESOLVED | Noted: 2017-08-16 | Resolved: 2023-11-26

## 2023-11-26 PROBLEM — R00.0 TACHYCARDIA, UNSPECIFIED: Status: RESOLVED | Noted: 2017-10-24 | Resolved: 2023-11-26

## 2023-12-01 ENCOUNTER — E-VISIT (OUTPATIENT)
Dept: PRIMARY CARE CLINIC | Age: 33
End: 2023-12-01

## 2023-12-01 DIAGNOSIS — J06.9 UPPER RESPIRATORY TRACT INFECTION, UNSPECIFIED TYPE: Primary | ICD-10-CM

## 2023-12-01 ASSESSMENT — LIFESTYLE VARIABLES
PACKS_PER_DAY: 1
SMOKING_YEARS: 10
SMOKING_STATUS: NO, BUT I USED TO SMOKE

## 2023-12-01 NOTE — PROGRESS NOTES
Nelda Bundy (1990) initiated an asynchronous digital communication through [employer]. HPI: per patient questionnaire     Exam: not applicable    Diagnoses and all orders for this visit:  Diagnoses and all orders for this visit:    Upper respiratory tract infection, unspecified type    E visit on 11/26/23. Given amoxicillin for presumed \"strep throat\". Was feeling better for 3 days but now her symptoms have returned. D/t worsening symptoms, recommend being evaluated in person      Time: EV2 - 11-20 minutes were spent on the digital evaluation and management of this patient.  18 min     Hany Jacobo, APRN - CNP

## 2023-12-27 ENCOUNTER — HOSPITAL ENCOUNTER (OUTPATIENT)
Dept: ULTRASOUND IMAGING | Age: 33
Discharge: HOME | End: 2023-12-27
Payer: COMMERCIAL

## 2023-12-27 ENCOUNTER — HOSPITAL ENCOUNTER (OUTPATIENT)
Age: 33
Discharge: HOME | End: 2023-12-27
Payer: COMMERCIAL

## 2023-12-27 ENCOUNTER — TELEPHONE (OUTPATIENT)
Dept: FAMILY MEDICINE CLINIC | Age: 33
End: 2023-12-27

## 2023-12-27 DIAGNOSIS — R53.82 CHRONIC FATIGUE: ICD-10-CM

## 2023-12-27 DIAGNOSIS — R10.11 COLICKY RIGHT UPPER QUADRANT PAIN: ICD-10-CM

## 2023-12-27 DIAGNOSIS — Z83.79 FAMILY HISTORY OF INFLAMMATORY BOWEL DISEASE: ICD-10-CM

## 2023-12-27 DIAGNOSIS — R10.11 COLICKY RIGHT UPPER QUADRANT PAIN: Primary | ICD-10-CM

## 2023-12-27 LAB
25(OH)D3 SERPL-MCNC: 27 NG/ML (ref 30–100)
ALBUMIN SERPL BCG-MCNC: 4.4 G/DL (ref 3.5–5.1)
ALP SERPL-CCNC: 51 U/L (ref 38–126)
ALT SERPL W/O P-5'-P-CCNC: 13 U/L (ref 11–66)
ANION GAP SERPL CALC-SCNC: 14 MEQ/L (ref 8–16)
AST SERPL-CCNC: 14 U/L (ref 5–40)
BASOPHILS ABSOLUTE: 0.1 THOU/MM3 (ref 0–0.1)
BASOPHILS NFR BLD AUTO: 0.8 %
BILIRUB SERPL-MCNC: 0.8 MG/DL (ref 0.3–1.2)
BUN SERPL-MCNC: 13 MG/DL (ref 7–22)
CALCIUM SERPL-MCNC: 9.2 MG/DL (ref 8.5–10.5)
CHLORIDE SERPL-SCNC: 103 MEQ/L (ref 98–111)
CO2 SERPL-SCNC: 23 MEQ/L (ref 23–33)
CREAT SERPL-MCNC: 0.8 MG/DL (ref 0.4–1.2)
DEPRECATED RDW RBC AUTO: 40.4 FL (ref 35–45)
EOSINOPHIL NFR BLD AUTO: 1.7 %
EOSINOPHILS ABSOLUTE: 0.1 THOU/MM3 (ref 0–0.4)
ERYTHROCYTE [DISTWIDTH] IN BLOOD BY AUTOMATED COUNT: 13.1 % (ref 11.5–14.5)
FERRITIN SERPL IA-MCNC: 61 NG/ML (ref 10–291)
GFR SERPL CREATININE-BSD FRML MDRD: > 60 ML/MIN/1.73M2
GLUCOSE SERPL-MCNC: 80 MG/DL (ref 70–108)
HCT VFR BLD AUTO: 44.7 % (ref 37–47)
HGB BLD-MCNC: 15 GM/DL (ref 12–16)
IMM GRANULOCYTES # BLD AUTO: 0.03 THOU/MM3 (ref 0–0.07)
IMM GRANULOCYTES NFR BLD AUTO: 0.4 %
IRON SATN MFR SERPL: 26 % (ref 20–50)
IRON SERPL-MCNC: 78 UG/DL (ref 50–170)
LYMPHOCYTES ABSOLUTE: 2 THOU/MM3 (ref 1–4.8)
LYMPHOCYTES NFR BLD AUTO: 26.4 %
MCH RBC QN AUTO: 28.8 PG (ref 26–33)
MCHC RBC AUTO-ENTMCNC: 33.6 GM/DL (ref 32.2–35.5)
MCV RBC AUTO: 86 FL (ref 81–99)
MONOCYTES ABSOLUTE: 0.6 THOU/MM3 (ref 0.4–1.3)
MONOCYTES NFR BLD AUTO: 8.6 %
NEUTROPHILS NFR BLD AUTO: 62.1 %
NRBC BLD AUTO-RTO: 0 /100 WBC
PLATELET # BLD AUTO: 270 THOU/MM3 (ref 130–400)
PMV BLD AUTO: 9.8 FL (ref 9.4–12.4)
POTASSIUM SERPL-SCNC: 3.9 MEQ/L (ref 3.5–5.2)
PROT SERPL-MCNC: 7.4 G/DL (ref 6.1–8)
RBC # BLD AUTO: 5.2 MILL/MM3 (ref 4.2–5.4)
SEGMENTED NEUTROPHILS ABSOLUTE COUNT: 4.7 THOU/MM3 (ref 1.8–7.7)
SODIUM SERPL-SCNC: 140 MEQ/L (ref 135–145)
TIBC SERPL-MCNC: 298 UG/DL (ref 171–450)
TSH SERPL DL<=0.005 MIU/L-ACNC: 2.57 UIU/ML (ref 0.4–4.2)
VIT B12 SERPL-MCNC: 386 PG/ML (ref 211–911)
WBC # BLD AUTO: 7.5 THOU/MM3 (ref 4.8–10.8)

## 2023-12-27 PROCEDURE — 80053 COMPREHEN METABOLIC PANEL: CPT

## 2023-12-27 PROCEDURE — 85025 COMPLETE CBC W/AUTO DIFF WBC: CPT

## 2023-12-27 PROCEDURE — 36415 COLL VENOUS BLD VENIPUNCTURE: CPT

## 2023-12-27 PROCEDURE — 86038 ANTINUCLEAR ANTIBODIES: CPT

## 2023-12-27 PROCEDURE — 82306 VITAMIN D 25 HYDROXY: CPT

## 2023-12-27 PROCEDURE — 82728 ASSAY OF FERRITIN: CPT

## 2023-12-27 PROCEDURE — 82607 VITAMIN B-12: CPT

## 2023-12-27 PROCEDURE — 86671 FUNGUS NES ANTIBODY: CPT

## 2023-12-27 PROCEDURE — 83540 ASSAY OF IRON: CPT

## 2023-12-27 PROCEDURE — 76705 ECHO EXAM OF ABDOMEN: CPT

## 2023-12-27 PROCEDURE — 84443 ASSAY THYROID STIM HORMONE: CPT

## 2023-12-27 PROCEDURE — 83550 IRON BINDING TEST: CPT

## 2023-12-27 PROCEDURE — 81291 MTHFR GENE: CPT

## 2023-12-27 PROCEDURE — 86255 FLUORESCENT ANTIBODY SCREEN: CPT

## 2023-12-27 NOTE — TELEPHONE ENCOUNTER
----- Message from JESSEE Lacey CNP sent at 12/27/2023  8:18 AM EST -----  Let Belkis Franco know her US of her gallbladder is normal. Let's order a HIDA scan, which measures the gallbladder function. Order placed.

## 2023-12-28 ENCOUNTER — TELEPHONE (OUTPATIENT)
Dept: FAMILY MEDICINE CLINIC | Age: 33
End: 2023-12-28

## 2023-12-28 NOTE — TELEPHONE ENCOUNTER
----- Message from JESSEE Cortes CNP sent at 12/28/2023  2:42 PM EST -----  Let Pati Sanchez know there are labs still pending, but what I've got so far looks ok. Her vitamin D was low at 27. I would rec'd she take 3795-6148 units of vitamin D daily. She can buy this OTC. I can send in a Rx if she wants me to, it's up to her.

## 2023-12-29 LAB
MTHFR C.1298A>C GENO BLD/T: NEGATIVE
MTHFR C.677C>T GENO BLD/T: NORMAL
MTHFR GENE MUT ANL BLD/T: NORMAL
NUCLEAR IGG SER QL IA: NORMAL
SPECIMEN SOURCE: NORMAL

## 2023-12-30 LAB
ANCA AB PATTERN SER IF-IMP: NORMAL
ANCA IGG TITR SER IF: NORMAL {TITER}
SACCHAROMYCES CEREVISIAE ANTIBODY: NORMAL

## 2024-01-02 ENCOUNTER — TELEPHONE (OUTPATIENT)
Dept: FAMILY MEDICINE CLINIC | Age: 34
End: 2024-01-02

## 2024-01-02 DIAGNOSIS — Z15.89 HOMOZYGOUS FOR MTHFR GENE MUTATION: Primary | ICD-10-CM

## 2024-01-02 NOTE — TELEPHONE ENCOUNTER
----- Message from Cb Quinones APRN - CNP sent at 1/2/2024  9:23 AM EST -----  Let Sumaya know her autoimmune labs were negative, however she was positive for the MTHFR gene variant. This can cause elevated homocysteine levels. Let's start with checking her homocysteine lab and see where it is. She may also benefit from seeing a . Lab ordered.

## 2024-01-04 NOTE — TELEPHONE ENCOUNTER
The left tympanic membrane is red, dull and has decreased mobility. The right is normal.      We have been unable to reach this patient by phone.  A letter is being sent.

## 2024-01-10 ENCOUNTER — E-VISIT (OUTPATIENT)
Dept: PRIMARY CARE CLINIC | Age: 34
End: 2024-01-10
Payer: COMMERCIAL

## 2024-01-10 DIAGNOSIS — R30.0 DYSURIA: Primary | ICD-10-CM

## 2024-01-10 PROCEDURE — 99422 OL DIG E/M SVC 11-20 MIN: CPT | Performed by: NURSE PRACTITIONER

## 2024-01-10 RX ORDER — NITROFURANTOIN 25; 75 MG/1; MG/1
100 CAPSULE ORAL 2 TIMES DAILY
Qty: 10 CAPSULE | Refills: 0 | Status: SHIPPED | OUTPATIENT
Start: 2024-01-10 | End: 2024-01-15

## 2024-01-11 NOTE — PROGRESS NOTES
Sumaya Wayne (1990) initiated an asynchronous digital communication through [employer].    HPI: per patient questionnaire     Exam: not applicable    Diagnoses and all orders for this visit:  Diagnoses and all orders for this visit:    Dysuria    Other orders  -     nitrofurantoin, macrocrystal-monohydrate, (MACROBID) 100 MG capsule; Take 1 capsule by mouth 2 times daily for 5 days    Medication sent. Increase fluids. Tylenol, motrin or azo for pain. F/u with pcp as needed    Time: EV2 - 11-20 minutes were spent on the digital evaluation and management of this patient. 18 min     Aparna Brown, JESSEE - CNP

## 2024-01-23 DIAGNOSIS — B00.1 COLD SORE: ICD-10-CM

## 2024-01-24 DIAGNOSIS — B00.1 COLD SORE: ICD-10-CM

## 2024-01-24 RX ORDER — VALACYCLOVIR HYDROCHLORIDE 1 G/1
TABLET, FILM COATED ORAL
Qty: 6 TABLET | Refills: 0 | OUTPATIENT
Start: 2024-01-24

## 2024-01-24 RX ORDER — VALACYCLOVIR HYDROCHLORIDE 1 G/1
TABLET, FILM COATED ORAL
Qty: 6 TABLET | Refills: 0 | Status: SHIPPED | OUTPATIENT
Start: 2024-01-24

## 2024-01-24 NOTE — TELEPHONE ENCOUNTER
Future Appointments   Date Time Provider Department Center   2/12/2024  7:00 AM STR NUCLEAR MEDICINE RM3 GE INFINIA 1 [facility] NUC MED STR Rad/Card   2/21/2024  9:30 AM Castillo Ibrahim MD [facility] [place] [facility] [place]    Recent Visits  Date Type Provider Dept   12/18/23 Office Visit Cb Quinones APRN - CNP [facility]   08/02/23 Office Visit Cb Quinones APRN - CNP [facility]   12/05/22 Office Visit Cb Quinones APRN - CNP [facility]   10/20/22 Office Visit Cb Quinones APRN - CNP [facility]   Showing recent visits within past 540 days with a meds authorizing provider and meeting all other requirements  Future Appointments  No visits were found meeting these conditions.  Showing future appointments within next 150 days with a meds authorizing provider and meeting all other requirements

## 2024-01-24 NOTE — TELEPHONE ENCOUNTER
Recent Visits  Date Type Provider Dept   12/18/23 Office Visit Cb Quinones APRN - CNP [facility]   08/02/23 Office Visit Cb Quinones APRN - CNP [facility]   12/05/22 Office Visit Cb Quinones APRN - CNP [facility]   10/20/22 Office Visit Cb Quinones APRN - CNP [facility]   Showing recent visits within past 540 days with a meds authorizing provider and meeting all other requirements  Future Appointments  No visits were found meeting these conditions.  Showing future appointments within next 150 days with a meds authorizing provider and meeting all other requirements

## 2024-02-12 ENCOUNTER — TELEPHONE (OUTPATIENT)
Dept: FAMILY MEDICINE CLINIC | Age: 34
End: 2024-02-12

## 2024-02-12 ENCOUNTER — HOSPITAL ENCOUNTER (OUTPATIENT)
Dept: NUCLEAR MEDICINE | Age: 34
Discharge: HOME | End: 2024-02-12
Payer: COMMERCIAL

## 2024-02-12 VITALS — WEIGHT: 175 LBS | BODY MASS INDEX: 31 KG/M2

## 2024-02-12 DIAGNOSIS — R10.11 COLICKY RIGHT UPPER QUADRANT PAIN: ICD-10-CM

## 2024-02-12 PROCEDURE — 78227 HEPATOBIL SYST IMAGE W/DRUG: CPT

## 2024-02-12 PROCEDURE — 6360000002 HC RX W HCPCS: Performed by: NURSE PRACTITIONER

## 2024-02-12 PROCEDURE — A9537 TC99M MEBROFENIN: HCPCS | Performed by: NURSE PRACTITIONER

## 2024-02-12 PROCEDURE — 3430000000 HC RX DIAGNOSTIC RADIOPHARMACEUTICAL: Performed by: NURSE PRACTITIONER

## 2024-02-12 RX ORDER — SINCALIDE 5 UG/5ML
0.02 INJECTION, POWDER, LYOPHILIZED, FOR SOLUTION INTRAVENOUS ONCE
Status: COMPLETED | OUTPATIENT
Start: 2024-02-12 | End: 2024-02-12

## 2024-02-12 RX ADMIN — Medication 8.5 MILLICURIE: at 06:53

## 2024-02-12 RX ADMIN — SINCALIDE 1.59 MCG: 5 INJECTION, POWDER, LYOPHILIZED, FOR SOLUTION INTRAVENOUS at 07:57

## 2024-02-12 NOTE — TELEPHONE ENCOUNTER
----- Message from JESSEE Badillo - CNP sent at 2/12/2024  9:33 AM EST -----  Let Sumaya know her HIDA scan is normal.

## 2024-03-10 ENCOUNTER — PATIENT MESSAGE (OUTPATIENT)
Dept: FAMILY MEDICINE CLINIC | Age: 34
End: 2024-03-10

## 2024-03-10 DIAGNOSIS — R53.83 FATIGUE, UNSPECIFIED TYPE: Primary | ICD-10-CM

## 2024-03-10 DIAGNOSIS — R40.0 DAYTIME SOMNOLENCE: ICD-10-CM

## 2024-03-10 DIAGNOSIS — G47.9 SLEEP DISTURBANCE: ICD-10-CM

## 2024-03-11 NOTE — TELEPHONE ENCOUNTER
From: Sumaya Wayne  To: Cb Quinones  Sent: 3/10/2024 4:32 PM EDT  Subject: Fatigue    I am looking into other various reasons for the fatigue I experience. I am wanting to get a cortisol level test done. I am wondering if adrenal fatigue? What about EBV test? I have had mono when I was 11 after swimming at a pool. I woke up with mono, an eye infection and ear infection. I am unsure of my fatigue causes, just I know I need to fix it. Do you think I should look into a homeopathic doctor??? I am willing to do anything at this point. Please let me know what you think.

## 2024-03-20 ENCOUNTER — OFFICE VISIT (OUTPATIENT)
Dept: UROLOGY | Age: 34
End: 2024-03-20
Payer: COMMERCIAL

## 2024-03-20 VITALS — RESPIRATION RATE: 16 BRPM | HEIGHT: 63 IN | BODY MASS INDEX: 31.01 KG/M2 | WEIGHT: 175 LBS

## 2024-03-20 DIAGNOSIS — R35.1 NOCTURIA: ICD-10-CM

## 2024-03-20 DIAGNOSIS — N32.81 OAB (OVERACTIVE BLADDER): Primary | ICD-10-CM

## 2024-03-20 DIAGNOSIS — Q64.0: ICD-10-CM

## 2024-03-20 DIAGNOSIS — N39.41 URGE INCONTINENCE OF URINE: ICD-10-CM

## 2024-03-20 LAB
BILIRUBIN URINE: NEGATIVE
BLOOD URINE, POC: ABNORMAL
CHARACTER, URINE: CLEAR
COLOR, URINE: YELLOW
GLUCOSE URINE: NEGATIVE MG/DL
KETONES, URINE: NEGATIVE
LEUKOCYTE CLUMPS, URINE: NEGATIVE
NITRITE, URINE: NEGATIVE
PH, URINE: 6.5 (ref 5–9)
PROTEIN, URINE: NEGATIVE MG/DL
SPECIFIC GRAVITY, URINE: 1.01 (ref 1–1.03)
UROBILINOGEN, URINE: 0.2 EU/DL (ref 0–1)

## 2024-03-20 PROCEDURE — 81003 URINALYSIS AUTO W/O SCOPE: CPT | Performed by: UROLOGY

## 2024-03-20 PROCEDURE — 99204 OFFICE O/P NEW MOD 45 MIN: CPT | Performed by: UROLOGY

## 2024-03-20 NOTE — PROGRESS NOTES
Med list reviewed by patient on mychart  
Normal peripheral pulses  Abdomen: Soft, non-tender, non-distended with no CVA, flank pain or hepatosplenomegaly.  No hernias.  Kidneys normal.  Lymphatics: No palpable lymphadenopathy.  Bladder non-tender and not distended.      Assessment and Plan      1. OAB (overactive bladder)    2. Urge incontinence of urine    3. Epispadias, female    4. Nocturia           Plan:      No follow-ups on file.  Trial of Myrbetriq 50mg.  Voiding diary.   See back in 6 weeks.         Dr. Castillo Ibrahim MD

## 2024-04-13 DIAGNOSIS — B00.1 COLD SORE: ICD-10-CM

## 2024-04-14 DIAGNOSIS — B00.1 COLD SORE: ICD-10-CM

## 2024-04-15 RX ORDER — VALACYCLOVIR HYDROCHLORIDE 1 G/1
TABLET, FILM COATED ORAL
Qty: 6 TABLET | Refills: 0 | OUTPATIENT
Start: 2024-04-15

## 2024-04-15 RX ORDER — VALACYCLOVIR HYDROCHLORIDE 1 G/1
TABLET, FILM COATED ORAL
Qty: 6 TABLET | Refills: 5 | Status: SHIPPED | OUTPATIENT
Start: 2024-04-15

## 2024-04-15 NOTE — TELEPHONE ENCOUNTER
Recent Visits  Date Type Provider Dept   12/18/23 Office Visit Cb Quinones APRN - CNP [facility]   08/02/23 Office Visit Cb Quinones APRN - CNP [facility]   12/05/22 Office Visit Cb Quinones APRN - CNP [facility]   Showing recent visits within past 540 days with a meds authorizing provider and meeting all other requirements  Future Appointments  No visits were found meeting these conditions.  Showing future appointments within next 150 days with a meds authorizing provider and meeting all other requirements

## 2024-04-22 ENCOUNTER — HOSPITAL ENCOUNTER (OUTPATIENT)
Dept: ULTRASOUND IMAGING | Age: 34
Discharge: HOME | End: 2024-04-22
Attending: UROLOGY
Payer: COMMERCIAL

## 2024-04-22 DIAGNOSIS — N39.41 URGE INCONTINENCE OF URINE: ICD-10-CM

## 2024-04-22 DIAGNOSIS — Q64.0: ICD-10-CM

## 2024-04-22 PROCEDURE — 76770 US EXAM ABDO BACK WALL COMP: CPT

## 2024-05-05 ENCOUNTER — PATIENT MESSAGE (OUTPATIENT)
Dept: FAMILY MEDICINE CLINIC | Age: 34
End: 2024-05-05

## 2024-05-05 DIAGNOSIS — R19.4 BOWEL HABIT CHANGES: Primary | ICD-10-CM

## 2024-05-05 DIAGNOSIS — Z80.0 FAMILY HISTORY OF COLON CANCER: ICD-10-CM

## 2024-05-06 NOTE — TELEPHONE ENCOUNTER
From: Sumaya Wayne  To: Cb EDDIE Stacie  Sent: 5/5/2024 11:43 PM EDT  Subject: Referral wanted    I am doing well with urology etc, awaiting next appt for him to tell me what the next process will be about my kidneys. I am wondering if I may have a gastroenterologist appt. Due to my gma having colon cancer. I have ribbon noted stools. I have blood on the tp, as I don’t think I have hemorrhoids. It doesn’t hurt. I have diarrhea every once in a while, then just mushy stool, and it’s never exactly formed. Thank you. I am waiting for right time to get lab results when I’m not working so much and up all night working.

## 2024-05-18 ENCOUNTER — NURSE ONLY (OUTPATIENT)
Dept: LAB | Age: 34
End: 2024-05-18

## 2024-05-18 DIAGNOSIS — Z15.89 HOMOZYGOUS FOR MTHFR GENE MUTATION: ICD-10-CM

## 2024-05-18 DIAGNOSIS — R73.03 PREDIABETES: ICD-10-CM

## 2024-05-18 DIAGNOSIS — R53.83 FATIGUE, UNSPECIFIED TYPE: ICD-10-CM

## 2024-05-18 LAB
CHOLESTEROL, FASTING: 164 MG/DL (ref 100–199)
CREAT UR-MCNC: 276.8 MG/DL
DEPRECATED MEAN GLUCOSE BLD GHB EST-ACNC: 87 MG/DL (ref 70–126)
HBA1C MFR BLD HPLC: 4.9 % (ref 4.4–6.4)
HDLC SERPL-MCNC: 49 MG/DL
HOMOCYSTEINE: 6 UMOL/L (ref 0–15)
LDLC SERPL CALC-MCNC: 106 MG/DL
MICROALBUMIN UR-MCNC: 6.94 MG/DL
MICROALBUMIN/CREAT RATIO PNL UR: 25 MG/G (ref 0–30)
TRIGLYCERIDE, FASTING: 44 MG/DL (ref 0–199)

## 2024-05-20 ENCOUNTER — TELEPHONE (OUTPATIENT)
Dept: FAMILY MEDICINE CLINIC | Age: 34
End: 2024-05-20

## 2024-05-20 LAB — EPSTEIN-BARR VIRUS ANTIBODIES: NORMAL

## 2024-05-20 NOTE — TELEPHONE ENCOUNTER
----- Message from Edgardo Easley, DO sent at 5/19/2024  8:46 PM EDT -----  Please let pt know that not all labs are back yet, but what is back thus far is WNL  Will call with rest of results once available  Let me know if questions, thanks!

## 2024-05-20 NOTE — TELEPHONE ENCOUNTER
Left message on answering machine requesting pt to call back at earliest convenience.       Signed HIPAA to not leave detailed message

## 2024-05-21 ENCOUNTER — TELEPHONE (OUTPATIENT)
Dept: FAMILY MEDICINE CLINIC | Age: 34
End: 2024-05-21

## 2024-05-21 NOTE — TELEPHONE ENCOUNTER
----- Message from Edgardo Easley,  sent at 5/21/2024  7:00 AM EDT -----  Please let pt know that camille barr testing (mono) shows prior exposure to the camille barr virus, which is very common and the majority of adults have antibodies to this virus. However, it does not show an active EBV infection.   Would recommend f/u with Cb if still having fatigue  Let me know if questions, thanks!

## 2024-05-29 ENCOUNTER — OFFICE VISIT (OUTPATIENT)
Dept: UROLOGY | Age: 34
End: 2024-05-29
Payer: COMMERCIAL

## 2024-05-29 VITALS — WEIGHT: 180 LBS | HEIGHT: 63 IN | BODY MASS INDEX: 31.89 KG/M2 | RESPIRATION RATE: 20 BRPM

## 2024-05-29 DIAGNOSIS — N32.81 OAB (OVERACTIVE BLADDER): Primary | ICD-10-CM

## 2024-05-29 DIAGNOSIS — Q64.0: ICD-10-CM

## 2024-05-29 DIAGNOSIS — N39.41 URGE INCONTINENCE OF URINE: ICD-10-CM

## 2024-05-29 PROCEDURE — 99214 OFFICE O/P EST MOD 30 MIN: CPT | Performed by: UROLOGY

## 2024-05-29 RX ORDER — TIRZEPATIDE 15 MG/.5ML
15 INJECTION, SOLUTION SUBCUTANEOUS WEEKLY
COMMUNITY

## 2024-05-29 NOTE — PROGRESS NOTES
Dr. Castillo Ibrahim MD  [facility]  Urology Clinic  New Patient Visit      Patient:  Sumaya Wayne  YOB: 1990  Date: 5/29/2024    HISTORY OF PRESENT ILLNESS:   The patient is a 33 y.o. female who presents today for evaluation of the following problems: OAB with urge incontinence. We started her on Myrbetriq 50mg and this reduced voids by 5 times per day. Failed ditropan ER and solifenacin in the past.     Overall the problem(s) : are worsening.  Associated Symptoms: No dysuria, gross hematuria.  Pain Severity: 0/10    Summary of old records:   History of bladder neck sling and history of female epispadias.     Imaging/Labs reviewed during today's visit:  I have independently reviewed and verified the following films during today's visit.  Renal US. Normal    Urinalysis today:  No results found for this visit on 05/29/24.      Last BUN and creatinine:  Lab Results   Component Value Date    BUN 13 12/27/2023     Lab Results   Component Value Date    CREATININE 0.8 12/27/2023       Imaging Reviewed during this Office Visit:   (results were independently reviewed by physician and radiology report verified)    PAST MEDICAL, FAMILY AND SOCIAL HISTORY:  Past Medical History:   Diagnosis Date    Bipolar disorder (HCC)     Pt states dx in 2009    Depressive disorder 05/02/2011    Epispadias, female     Simple obesity 11/11/2019    Tobacco abuse      Past Surgical History:   Procedure Laterality Date    ADENOIDECTOMY  1/15/2016    BLADDER REPAIR      Pt states bladder construction when she was 2 y.o.    ELBOW SURGERY  2010    left   arthroscopy    SEPTOPLASTY  1/15/2016    Turbinoplasties    TONSILLECTOMY      at 11 years of age    TONSILLECTOMY AND ADENOIDECTOMY      Pt states had this at 11 y.o.    WISDOM TOOTH EXTRACTION  2015     Family History   Problem Relation Age of Onset    Bipolar Disorder Mother     Schizophrenia Mother     COPD Mother     Emphysema Mother     Other Father         Nodules

## 2024-06-10 ENCOUNTER — OFFICE VISIT (OUTPATIENT)
Dept: PULMONOLOGY | Age: 34
End: 2024-06-10
Payer: COMMERCIAL

## 2024-06-10 VITALS
SYSTOLIC BLOOD PRESSURE: 122 MMHG | DIASTOLIC BLOOD PRESSURE: 74 MMHG | TEMPERATURE: 97.1 F | OXYGEN SATURATION: 98 % | HEIGHT: 63 IN | HEART RATE: 79 BPM | BODY MASS INDEX: 32.25 KG/M2 | WEIGHT: 182 LBS

## 2024-06-10 DIAGNOSIS — R53.82 CHRONIC FATIGUE: ICD-10-CM

## 2024-06-10 DIAGNOSIS — E66.9 OBESITY (BMI 30-39.9): ICD-10-CM

## 2024-06-10 DIAGNOSIS — G47.19 EXCESSIVE DAYTIME SLEEPINESS: Primary | ICD-10-CM

## 2024-06-10 DIAGNOSIS — G47.9 SLEEP DISTURBANCE: ICD-10-CM

## 2024-06-10 DIAGNOSIS — R35.1 NOCTURIA: ICD-10-CM

## 2024-06-10 PROCEDURE — 99204 OFFICE O/P NEW MOD 45 MIN: CPT | Performed by: INTERNAL MEDICINE

## 2024-06-10 NOTE — PROGRESS NOTES
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  Reports that he had a sleep schedule is from 11 PM to 8 AM when she wakes up to see her daughter going out to school and then she goes to bed and is sleeps an hour couple of hours, she takes a nap before going to bed at 230, she does not feel refreshed ever she is always chronically fatigue poor energy, denies sleepiness driving, she does snore but  is soft    H/O T&A, septoplasty    Sumaya has been experiencing chronic fatigue, nonrestorative sleep for a couple of years she has nocturia, urinary incontinence, weak urinary stream with history of congenital epispadias which were repaired at age 4 however reports getting up to 10 times per night to urinate with very efficacious sleep disruption.    Snores but softly on intermittently, obese with about 40 pounds weight gain with the use of GLP-1 meds    Stress at work sometimes her sleep is disrupted thinking about work and financial issues, has an 11-year-old daughter    Extensive workup for chronic fatigue has been nonrevealing, under the care of urology for bladder and urinary issues, referred to GI for the possibility of IBD  H/O Bipolar disorder listed in Epic    Time in Bed:   Bedtime: 11p.m.   Awakens  8 a.m.   Different on weekends? No      Sumaya falls asleep in 60  minutes.  Any awakenings? Yes, multiple to urinate  Difficulty Falling back to sleep? No  Symptoms began:  several years ago.    Symptoms include: snoring, disrupted sleep, naps    Previous evaluation and treatment?No       She denies any history of sleep walking or sleep talking. No history of seizures activity. No history suggestive of restless legs syndrome. No history of bruxism. No history

## 2024-06-11 ENCOUNTER — E-VISIT (OUTPATIENT)
Dept: PRIMARY CARE CLINIC | Age: 34
End: 2024-06-11
Payer: COMMERCIAL

## 2024-06-11 DIAGNOSIS — J01.90 ACUTE NON-RECURRENT SINUSITIS, UNSPECIFIED LOCATION: Primary | ICD-10-CM

## 2024-06-11 PROCEDURE — 99422 OL DIG E/M SVC 11-20 MIN: CPT | Performed by: NURSE PRACTITIONER

## 2024-06-11 RX ORDER — AMOXICILLIN 875 MG/1
875 TABLET, COATED ORAL 2 TIMES DAILY
Qty: 20 TABLET | Refills: 0 | Status: SHIPPED | OUTPATIENT
Start: 2024-06-11 | End: 2024-06-21

## 2024-06-11 ASSESSMENT — LIFESTYLE VARIABLES
PACKS_PER_DAY: .3
SMOKING_STATUS: NO, BUT I USED TO SMOKE
SMOKING_YEARS: 10

## 2024-06-11 NOTE — PROGRESS NOTES
Sumaya Wayne (1990) initiated an asynchronous digital communication through [employer].    HPI: per patient questionnaire     Exam: not applicable    Diagnoses and all orders for this visit:  Diagnoses and all orders for this visit:    Acute non-recurrent sinusitis, unspecified location    Other orders  -     amoxicillin (AMOXIL) 875 MG tablet; Take 1 tablet by mouth 2 times daily for 10 days      Symptoms for 6 days.  Antibiotics sent.  Supportive care measures provided.  Follow-up PCP as needed  Time: EV2 - 11-20 minutes were spent on the digital evaluation and management of this patient. 18 min     JESSEE Fuller - CNP I

## 2024-07-08 ENCOUNTER — HOSPITAL ENCOUNTER (OUTPATIENT)
Dept: SLEEP CENTER | Age: 34
Discharge: HOME | End: 2024-07-10
Payer: COMMERCIAL

## 2024-07-08 DIAGNOSIS — R53.82 CHRONIC FATIGUE: ICD-10-CM

## 2024-07-08 DIAGNOSIS — G47.9 SLEEP DISTURBANCE: ICD-10-CM

## 2024-07-08 DIAGNOSIS — E66.9 OBESITY (BMI 30-39.9): ICD-10-CM

## 2024-07-08 DIAGNOSIS — R35.1 NOCTURIA: ICD-10-CM

## 2024-07-08 DIAGNOSIS — G47.19 EXCESSIVE DAYTIME SLEEPINESS: ICD-10-CM

## 2024-07-08 PROCEDURE — 95806 SLEEP STUDY UNATT&RESP EFFT: CPT

## 2024-07-08 NOTE — PROGRESS NOTES
Sumaya presents today for a HST instruction and demonstration on unit # 7077. Questions were asked and answers given.  She was able to return demonstration and verbalized understanding.  The sleep center control room phone number was provided in case questions arise during the study. Informed patient to call 911 in case of an emergency.   She states she will return the unit tomorrow before 1000.                       Title:  Home Sleep Apnea Testing (HSAT)     Approved by:  Elizabeth Portillo MD        Approval Date:   March, 2024 Next Review:   March, 2026       Responsible Party:  Fina Ramos  Institution/Entities Applies to:    [facility]    Policy Number:  None      Document Type:  Such as Guideline, Policy,   Policy & Procedure, or Procedure, Instructions   Manual:  Policy and Procedures     Section: IV  Policy Start Date: June, 2011       HOME SLEEP APNEA TESTING (HSAT)      PURPOSE: To ensure home sleep apnea testing (HSAT) conducted by the sleep facility adheres to the current AASM practice parameters, clinical practice guidelines, best practice and clinical guidelines in regard to the diagnosis of YOLANDA in adults.       POLICY:      HSAT is a method of recording certain parameters which will target and measure, minimally, heart rate, oxygen saturation, respiratory airflow, respiratory effort and snoring for the purpose of evaluating a patient for YOLANDA.       HSAT will be performed in conjunction with a comprehensive sleep evaluation by an appropriately licensed sleep facility medical staff member. All portable monitoring equipment will be FDA-approved and appropriately maintained to ensure patient safety and efficiency of the test.       PROCEDURE:      An order from a licensed sleep physician along with appropriate medical history documenting the indication for HSAT that complies with the AASM practice parameters.    All tests will be performed, and records will

## 2024-07-09 ENCOUNTER — PATIENT MESSAGE (OUTPATIENT)
Dept: FAMILY MEDICINE CLINIC | Age: 34
End: 2024-07-09

## 2024-07-09 RX ORDER — FLUCONAZOLE 150 MG/1
150 TABLET ORAL
Qty: 2 TABLET | Refills: 0 | Status: SHIPPED | OUTPATIENT
Start: 2024-07-09 | End: 2024-07-15

## 2024-07-09 NOTE — TELEPHONE ENCOUNTER
From: Sumaya Wayne  To: Cb Quinones  Sent: 7/9/2024 9:12 AM EDT  Subject: Question    I am on nitrofuorate for a UTI. I had the colonoscopy and I think it triggered cystitis for me. I had urgency and pressure when urinating. I did an evisit and got an atb. Now I think I have a yeast infection, I keep smelling yeast somewhere. I must not have been eating enough yogurt. Do I stop taking the ATB? Or what should I do now????

## 2024-07-19 ENCOUNTER — LAB (OUTPATIENT)
Dept: LAB | Age: 34
End: 2024-07-19

## 2024-07-23 ENCOUNTER — OFFICE VISIT (OUTPATIENT)
Dept: PULMONOLOGY | Age: 34
End: 2024-07-23
Payer: COMMERCIAL

## 2024-07-23 VITALS
WEIGHT: 185.6 LBS | TEMPERATURE: 97.7 F | SYSTOLIC BLOOD PRESSURE: 118 MMHG | DIASTOLIC BLOOD PRESSURE: 80 MMHG | HEIGHT: 63 IN | HEART RATE: 88 BPM | BODY MASS INDEX: 32.89 KG/M2 | OXYGEN SATURATION: 97 %

## 2024-07-23 DIAGNOSIS — E66.9 OBESITY (BMI 30-39.9): ICD-10-CM

## 2024-07-23 DIAGNOSIS — R06.83 SNORING: Primary | ICD-10-CM

## 2024-07-23 DIAGNOSIS — R53.83 OTHER FATIGUE: ICD-10-CM

## 2024-07-23 PROCEDURE — 99213 OFFICE O/P EST LOW 20 MIN: CPT | Performed by: INTERNAL MEDICINE

## 2024-07-23 NOTE — PROGRESS NOTES
Sleep Medicine    Sumaya Wayne, 33 y.o.  [id number]    Nurses Notes   HST F/U   SAQLI: 60   ESS:2  Study Results    Initial Study Date -  7.8.24  AHI -  0.9    Total Events - 9  (Apneas  0  Hypopneas 6  Central  3)  (Total Sleep Time - 608.5 min)  Time with Sats below 88% - 1.1 min  Oxygen level - Room Air  95.5      INTERVAL HISTORY         Sumaya Wayne is a 33 y.o. old female who comes in to review the results of her recent sleep study, to answer questions and to explore options for treatment.    PMH  Past Medical History:   Diagnosis Date    Bipolar disorder (HCC)     Pt states dx in     Depressive disorder 2011    Epispadias, female     Simple obesity 2019    Tobacco abuse      Past Surgical History:   Procedure Laterality Date    ADENOIDECTOMY  1/15/2016    BLADDER REPAIR      Pt states bladder construction when she was 2 y.o.    ELBOW SURGERY      left   arthroscopy    SEPTOPLASTY  1/15/2016    Turbinoplasties    TONSILLECTOMY      at 11 years of age    TONSILLECTOMY AND ADENOIDECTOMY      Pt states had this at 11 y.o.    WISDOM TOOTH EXTRACTION       Social History     Tobacco Use    Smoking status: Former     Current packs/day: 0.00     Average packs/day: 0.4 packs/day for 10.5 years (3.9 ttl pk-yrs)     Types: Cigarettes     Start date: 2007     Quit date: 7/3/2017     Years since quittin.0    Smokeless tobacco: Never   Substance Use Topics    Alcohol use: No    Drug use: Not Currently     Comment: heroin- last use 2017     Family History   Problem Relation Age of Onset    Bipolar Disorder Mother     Schizophrenia Mother     COPD Mother     Emphysema Mother     Other Father         Nodules in lungs due to painting for years.    Alcohol Abuse Father     Ulcerative Colitis Father     COPD Maternal Grandmother     Emphysema Maternal Grandmother     Diabetes Maternal Grandmother     Other Maternal Grandfather         Pt states maternal grandpa  during

## 2024-07-25 ENCOUNTER — E-VISIT (OUTPATIENT)
Dept: FAMILY MEDICINE CLINIC | Age: 34
End: 2024-07-25
Payer: COMMERCIAL

## 2024-07-25 DIAGNOSIS — B37.31 VAGINAL CANDIDA: Primary | ICD-10-CM

## 2024-07-25 PROCEDURE — 99421 OL DIG E/M SVC 5-10 MIN: CPT | Performed by: NURSE PRACTITIONER

## 2024-07-25 RX ORDER — FLUCONAZOLE 150 MG/1
150 TABLET ORAL
Qty: 2 TABLET | Refills: 0 | Status: SHIPPED | OUTPATIENT
Start: 2024-07-25 | End: 2024-07-31

## 2024-07-25 NOTE — PROGRESS NOTES
On the date of the visit, I have spent 5 minutes in face to face time with patient as well as coordinating care, reviewing previous notes and test results.

## 2024-07-29 ENCOUNTER — HOSPITAL ENCOUNTER (OUTPATIENT)
Dept: WOMENS IMAGING | Age: 34
Discharge: HOME | End: 2024-07-29
Payer: COMMERCIAL

## 2024-07-29 VITALS — WEIGHT: 185 LBS | BODY MASS INDEX: 32.78 KG/M2 | HEIGHT: 63 IN

## 2024-07-29 DIAGNOSIS — N63.20 MASS OF LEFT BREAST, UNSPECIFIED QUADRANT: ICD-10-CM

## 2024-07-29 PROCEDURE — 76642 ULTRASOUND BREAST LIMITED: CPT

## 2024-07-29 PROCEDURE — G0279 TOMOSYNTHESIS, MAMMO: HCPCS

## 2024-08-01 LAB — CYTOLOGY THIN PREP PAP: NORMAL

## 2024-12-06 ENCOUNTER — E-VISIT (OUTPATIENT)
Dept: PRIMARY CARE CLINIC | Age: 34
End: 2024-12-06

## 2024-12-06 DIAGNOSIS — J06.9 VIRAL UPPER RESPIRATORY TRACT INFECTION: Primary | ICD-10-CM

## 2024-12-06 ASSESSMENT — LIFESTYLE VARIABLES
SMOKING_YEARS: 1
SMOKING_STATUS: YES

## 2024-12-06 NOTE — PROGRESS NOTES
Sumaya Wayne (1990) initiated an asynchronous digital communication through [employer].    HPI: per patient questionnaire     Exam: not applicable    Diagnoses and all orders for this visit:  Diagnoses and all orders for this visit:    Viral upper respiratory tract infection    Sx started 2 days ago. Likely viral or allergy in nature. Recommend supportive care measures for 7-10 days before antibiotics. Supportive care suggestions provided. F/u with pcp as needed     Time: EV2 - 11-20 minutes were spent on the digital evaluation and management of this patient. 17 min     JESSEE Fuller - CNP

## 2024-12-18 ENCOUNTER — E-VISIT (OUTPATIENT)
Dept: PRIMARY CARE CLINIC | Age: 34
End: 2024-12-18
Payer: COMMERCIAL

## 2024-12-18 DIAGNOSIS — R39.9 UTI SYMPTOMS: Primary | ICD-10-CM

## 2024-12-18 PROCEDURE — 99421 OL DIG E/M SVC 5-10 MIN: CPT | Performed by: FAMILY MEDICINE

## 2024-12-18 RX ORDER — NITROFURANTOIN 25; 75 MG/1; MG/1
100 CAPSULE ORAL 2 TIMES DAILY
Qty: 14 CAPSULE | Refills: 0 | Status: SHIPPED | OUTPATIENT
Start: 2024-12-18 | End: 2024-12-25

## 2024-12-18 NOTE — PROGRESS NOTES
36.8
HPI: per patient questionnaire  Exam: not applicable (electronic visit)  Diagnoses and all orders for this visit:    UTI symptoms  -     nitrofurantoin, macrocrystal-monohydrate, (MACROBID) 100 MG capsule; Take 1 capsule by mouth 2 times daily for 7 days        Patient instructed to call the office if worsens, or fails to improve as anticipated.    5-10 minutes were spent on the digital evaluation and management of this patient.  
36.7

## 2025-01-06 ENCOUNTER — E-VISIT (OUTPATIENT)
Dept: OTHER | Facility: CLINIC | Age: 35
End: 2025-01-06
Payer: COMMERCIAL

## 2025-01-06 DIAGNOSIS — J06.9 VIRAL UPPER RESPIRATORY TRACT INFECTION: Primary | ICD-10-CM

## 2025-01-06 PROCEDURE — 99423 OL DIG E/M SVC 21+ MIN: CPT | Performed by: NURSE PRACTITIONER

## 2025-01-06 RX ORDER — METHYLPREDNISOLONE 4 MG/1
TABLET ORAL
Qty: 1 KIT | Refills: 0 | Status: SHIPPED | OUTPATIENT
Start: 2025-01-06 | End: 2025-01-12

## 2025-01-06 ASSESSMENT — LIFESTYLE VARIABLES
PACKS_PER_DAY: .3
SMOKING_YEARS: 10
SMOKING_STATUS: NO, I'M A FORMER SMOKER

## 2025-01-06 NOTE — PROGRESS NOTES
Sumaya Wayne (1990) initiated an asynchronous digital communication through [employer].    HPI: per patient questionnaire     Exam: not applicable    Diagnoses and all orders for this visit:  Diagnoses and all orders for this visit:    Viral upper respiratory tract infection      Symptom started 2 days ago.  Likely viral or allergy I will send in a steroid.  Recommend being seen in person.  Supportive care measures provided    Time: EV3 - 21 or more minutes were spent on the digital evaluation and management of this patient.21 min     Aparna Brown, JESSEE - CNP

## 2025-04-23 ENCOUNTER — OFFICE VISIT (OUTPATIENT)
Dept: FAMILY MEDICINE CLINIC | Age: 35
End: 2025-04-23

## 2025-04-23 VITALS
RESPIRATION RATE: 12 BRPM | HEART RATE: 79 BPM | WEIGHT: 181.4 LBS | DIASTOLIC BLOOD PRESSURE: 80 MMHG | BODY MASS INDEX: 32.14 KG/M2 | SYSTOLIC BLOOD PRESSURE: 126 MMHG | TEMPERATURE: 97.2 F | HEIGHT: 63 IN | OXYGEN SATURATION: 99 %

## 2025-04-23 DIAGNOSIS — R09.A2 GLOBUS SENSATION: ICD-10-CM

## 2025-04-23 DIAGNOSIS — F19.10 POLYSUBSTANCE ABUSE (HCC): ICD-10-CM

## 2025-04-23 DIAGNOSIS — R59.1 LYMPHADENOPATHY OF HEAD AND NECK: Primary | ICD-10-CM

## 2025-04-23 SDOH — ECONOMIC STABILITY: FOOD INSECURITY: WITHIN THE PAST 12 MONTHS, THE FOOD YOU BOUGHT JUST DIDN'T LAST AND YOU DIDN'T HAVE MONEY TO GET MORE.: NEVER TRUE

## 2025-04-23 SDOH — ECONOMIC STABILITY: FOOD INSECURITY: WITHIN THE PAST 12 MONTHS, YOU WORRIED THAT YOUR FOOD WOULD RUN OUT BEFORE YOU GOT MONEY TO BUY MORE.: NEVER TRUE

## 2025-04-23 NOTE — PROGRESS NOTES
[facility] at [id number] [place] [address]  Chief Complaint   Patient presents with    Annual Exam     States needing a referral with ENT for lymph node congestion. Would like to see [facility]       History obtained from chart review and the patient.    SUBJECTIVE:  Sumaya Wayne is a 34 y.o. female that presents today for lymphadenopathy    C/o swelling right side of face, down neck and into axilla 2 weeks ago  She also had some right sided sore throat, ear fullness  Had labs done ordered earlier this year (ordered by OBGYN), neutrophils were low, lymphocytes were elevated  Symptoms have now resolved, but still has some residual sensation of something is in the back of her throat    Age/Gender Health Maintenance    Lipid - 40  DM Screen - 40  Colon Cancer Screening - 45  Lung Cancer Screening (Age 55 to 80 with 30 pack year hx, current smoker or quit within past 15 years) - 50    Tetanus - needs  Influenza Vaccine - yearly  Pneumonia Vaccine - 65  Zostavax - 50   HPV Vaccine - n/a    Breast Cancer Screening - 50  Cervical Cancer Screening - 21  Osteoporosis Screening - 65  Chlamydia Screen - n/a    PSA testing discussion - n/a  AAA Screening - n/a    Falls screening - n/a    Current Outpatient Medications   Medication Sig Dispense Refill    MOUNJARO 15 MG/0.5ML SOPN SC injection Inject 0.5 mLs into the skin once a week      valACYclovir (VALTREX) 1 g tablet TAKE 1 TABLET BY MOUTH TWICE DAILY FOR 3 DAYS 6 tablet 5    mirabegron (MYRBETRIQ) 50 MG TB24 Take 50 mg by mouth daily 28 tablet 0    ketoconazole (NIZORAL) 2 % shampoo Apply topically daily as needed. 120 mL 1     No current facility-administered medications for this visit.     No orders of the defined types were placed in this encounter.          All medications reviewed and reconciled, including OTC and herbal medications. Updated list given to patient.       Patient Active Problem List   Diagnosis    Skin lesion of

## 2025-04-28 DIAGNOSIS — B00.1 COLD SORE: ICD-10-CM

## 2025-04-29 RX ORDER — VALACYCLOVIR HYDROCHLORIDE 1 G/1
TABLET, FILM COATED ORAL
Qty: 6 TABLET | Refills: 3 | Status: SHIPPED | OUTPATIENT
Start: 2025-04-29

## 2025-04-29 NOTE — TELEPHONE ENCOUNTER
Recent Visits  Date Type Provider Dept   04/23/25 Office Visit Cb Quinones APRN - CNP [facility]   12/18/23 Office Visit Cb Quinones APRN - CNP [facility]   Showing recent visits within past 540 days with a meds authorizing provider and meeting all other requirements  Future Appointments  No visits were found meeting these conditions.  Showing future appointments within next 150 days with a meds authorizing provider and meeting all other requirements

## 2025-05-14 ENCOUNTER — RESULTS FOLLOW-UP (OUTPATIENT)
Dept: FAMILY MEDICINE CLINIC | Age: 35
End: 2025-05-14

## 2025-05-14 LAB
BASOPHILS ABSOLUTE: 0.07 K/UL (ref 0–0.2)
BASOPHILS RELATIVE PERCENT: 1.3 % (ref 0–2)
EOSINOPHILS ABSOLUTE: 0.12 K/UL (ref 0–0.8)
EOSINOPHILS RELATIVE PERCENT: 2.2 % (ref 0–5)
HCT VFR BLD CALC: 41.5 % (ref 35–47)
HEMOGLOBIN: 13.4 G/DL (ref 11.9–16)
IMMATURE GRANS (ABS): 0.01 K/UL (ref 0–0.06)
IMMATURE GRANULOCYTES %: 0.2 % (ref 0–2)
LYMPHOCYTES ABSOLUTE: 2.12 K/UL (ref 0.9–5.2)
LYMPHOCYTES RELATIVE PERCENT: 38.9 % (ref 20–45)
MCH RBC QN AUTO: 28.8 PG (ref 26–33)
MCHC RBC AUTO-ENTMCNC: 32.3 G/DL (ref 32–35)
MCV RBC AUTO: 89 FL (ref 75–100)
MONOCYTES ABSOLUTE: 0.5 K/UL (ref 0.1–1)
MONOCYTES RELATIVE PERCENT: 9.2 % (ref 0–13)
NEUTROPHILS ABSOLUTE: 2.63 K/UL (ref 1.9–8)
NEUTROPHILS RELATIVE PERCENT: 48.2 % (ref 45–75)
PDW BLD-RTO: 12.6 % (ref 11.2–14.8)
PLATELET # BLD: 247 THOUS/CMM (ref 140–440)
RBC # BLD: 4.66 MILL/CMM (ref 3.8–5.2)
WBC # BLD: 5.5 THDS/CMM (ref 3.6–11)

## 2025-05-24 ENCOUNTER — E-VISIT (OUTPATIENT)
Dept: PRIMARY CARE CLINIC | Age: 35
End: 2025-05-24

## 2025-05-24 DIAGNOSIS — H10.9 CONJUNCTIVITIS, UNSPECIFIED CONJUNCTIVITIS TYPE, UNSPECIFIED LATERALITY: Primary | ICD-10-CM

## 2025-05-24 RX ORDER — POLYMYXIN B SULFATE AND TRIMETHOPRIM 1; 10000 MG/ML; [USP'U]/ML
1 SOLUTION OPHTHALMIC EVERY 4 HOURS
Qty: 3 ML | Refills: 0 | Status: SHIPPED | OUTPATIENT
Start: 2025-05-24 | End: 2025-06-03

## 2025-07-20 DIAGNOSIS — B00.1 COLD SORE: ICD-10-CM

## 2025-07-21 RX ORDER — VALACYCLOVIR HYDROCHLORIDE 1 G/1
TABLET, FILM COATED ORAL
Qty: 6 TABLET | Refills: 0 | Status: SHIPPED | OUTPATIENT
Start: 2025-07-21

## 2025-08-20 DIAGNOSIS — B00.1 COLD SORE: ICD-10-CM

## 2025-08-20 RX ORDER — VALACYCLOVIR HYDROCHLORIDE 1 G/1
TABLET, FILM COATED ORAL
Qty: 6 TABLET | Refills: 3 | Status: SHIPPED | OUTPATIENT
Start: 2025-08-20